# Patient Record
Sex: MALE | Race: WHITE | NOT HISPANIC OR LATINO | Employment: OTHER | URBAN - METROPOLITAN AREA
[De-identification: names, ages, dates, MRNs, and addresses within clinical notes are randomized per-mention and may not be internally consistent; named-entity substitution may affect disease eponyms.]

---

## 2019-04-24 ENCOUNTER — OFFICE VISIT (OUTPATIENT)
Dept: INTERNAL MEDICINE | Facility: CLINIC | Age: 29
End: 2019-04-24
Payer: MEDICARE

## 2019-04-24 ENCOUNTER — LAB VISIT (OUTPATIENT)
Dept: LAB | Facility: HOSPITAL | Age: 29
End: 2019-04-24
Attending: INTERNAL MEDICINE
Payer: MEDICARE

## 2019-04-24 VITALS
BODY MASS INDEX: 22.84 KG/M2 | HEART RATE: 62 BPM | DIASTOLIC BLOOD PRESSURE: 60 MMHG | HEIGHT: 67 IN | RESPIRATION RATE: 18 BRPM | TEMPERATURE: 98 F | WEIGHT: 145.5 LBS | SYSTOLIC BLOOD PRESSURE: 118 MMHG

## 2019-04-24 DIAGNOSIS — R61 HYPERHIDROSIS: Primary | ICD-10-CM

## 2019-04-24 DIAGNOSIS — F98.8 ATTENTION DEFICIT DISORDER, UNSPECIFIED HYPERACTIVITY PRESENCE: ICD-10-CM

## 2019-04-24 DIAGNOSIS — F41.9 ANXIETY: ICD-10-CM

## 2019-04-24 DIAGNOSIS — R61 HYPERHIDROSIS: ICD-10-CM

## 2019-04-24 DIAGNOSIS — F31.9 BIPOLAR AFFECTIVE DISORDER, REMISSION STATUS UNSPECIFIED: ICD-10-CM

## 2019-04-24 LAB — TSH SERPL DL<=0.005 MIU/L-ACNC: 0.6 UIU/ML (ref 0.4–4)

## 2019-04-24 PROCEDURE — 99999 PR PBB SHADOW E&M-NEW PATIENT-LVL III: ICD-10-PCS | Mod: PBBFAC,,, | Performed by: INTERNAL MEDICINE

## 2019-04-24 PROCEDURE — 3008F BODY MASS INDEX DOCD: CPT | Mod: CPTII,S$GLB,, | Performed by: INTERNAL MEDICINE

## 2019-04-24 PROCEDURE — 36415 COLL VENOUS BLD VENIPUNCTURE: CPT | Mod: PO

## 2019-04-24 PROCEDURE — 99204 OFFICE O/P NEW MOD 45 MIN: CPT | Mod: S$GLB,,, | Performed by: INTERNAL MEDICINE

## 2019-04-24 PROCEDURE — 99999 PR PBB SHADOW E&M-NEW PATIENT-LVL III: CPT | Mod: PBBFAC,,, | Performed by: INTERNAL MEDICINE

## 2019-04-24 PROCEDURE — 84443 ASSAY THYROID STIM HORMONE: CPT

## 2019-04-24 PROCEDURE — 99204 PR OFFICE/OUTPT VISIT, NEW, LEVL IV, 45-59 MIN: ICD-10-PCS | Mod: S$GLB,,, | Performed by: INTERNAL MEDICINE

## 2019-04-24 PROCEDURE — 3008F PR BODY MASS INDEX (BMI) DOCUMENTED: ICD-10-PCS | Mod: CPTII,S$GLB,, | Performed by: INTERNAL MEDICINE

## 2019-04-24 RX ORDER — DEXTROAMPHETAMINE SACCHARATE, AMPHETAMINE ASPARTATE, DEXTROAMPHETAMINE SULFATE AND AMPHETAMINE SULFATE 5; 5; 5; 5 MG/1; MG/1; MG/1; MG/1
20 TABLET ORAL 2 TIMES DAILY
Refills: 0 | COMMUNITY
Start: 2019-04-08

## 2019-04-24 RX ORDER — HYDROXYZINE HYDROCHLORIDE 50 MG/1
50 TABLET, FILM COATED ORAL 3 TIMES DAILY
Refills: 3 | COMMUNITY
Start: 2019-03-20 | End: 2023-01-30

## 2019-04-24 RX ORDER — CLONAZEPAM 0.5 MG/1
0.5 TABLET ORAL 3 TIMES DAILY
Refills: 3 | COMMUNITY
Start: 2019-03-18 | End: 2023-01-30

## 2019-04-24 RX ORDER — OLANZAPINE 10 MG/1
10 TABLET ORAL NIGHTLY
Refills: 0 | COMMUNITY
Start: 2019-01-25 | End: 2023-01-31 | Stop reason: DRUGHIGH

## 2019-04-24 RX ORDER — FLUOXETINE HYDROCHLORIDE 20 MG/1
20 CAPSULE ORAL EVERY MORNING
Refills: 0 | COMMUNITY
Start: 2019-01-23 | End: 2019-06-27

## 2019-04-24 RX ORDER — LAMOTRIGINE 100 MG/1
TABLET ORAL
Refills: 3 | COMMUNITY
Start: 2019-03-18 | End: 2019-06-27

## 2019-04-24 NOTE — PROGRESS NOTES
Subjective:       Patient ID: Mark Rich is a 29 y.o. male.    Chief Complaint: Establish Care and Excessive Sweating (both hands)    HPI   Pt with Bipolar d/o, anxiety, ADD is here for evaluation of worsening symptoms of hyperhidrosis over the last few years loacted in his axillar/ palms and feet. He has not tried any OTC meds for relief.   Review of Systems   Constitutional: Negative for activity change, appetite change, chills, diaphoresis, fatigue, fever and unexpected weight change.   HENT: Negative for postnasal drip, rhinorrhea, sinus pressure, sneezing, sore throat, trouble swallowing and voice change.    Respiratory: Negative for cough, shortness of breath and wheezing.    Cardiovascular: Negative for chest pain, palpitations and leg swelling.   Gastrointestinal: Negative for abdominal pain, blood in stool, constipation, diarrhea, nausea and vomiting.   Genitourinary: Negative for dysuria.   Musculoskeletal: Negative for arthralgias and myalgias.   Skin: Negative for rash and wound.   Allergic/Immunologic: Negative for environmental allergies and food allergies.   Hematological: Negative for adenopathy. Does not bruise/bleed easily.   Psychiatric/Behavioral: Positive for dysphoric mood. Negative for self-injury and suicidal ideas. The patient is nervous/anxious.        Objective:      Physical Exam   Constitutional: He is oriented to person, place, and time. He appears well-developed and well-nourished. No distress.   HENT:   Head: Normocephalic and atraumatic.   Eyes: Pupils are equal, round, and reactive to light. Conjunctivae and EOM are normal. Right eye exhibits no discharge. Left eye exhibits no discharge. No scleral icterus.   Neck: Normal range of motion. Neck supple. No JVD present.   Cardiovascular: Normal rate, regular rhythm and normal heart sounds.   No murmur heard.  Pulmonary/Chest: Effort normal and breath sounds normal. No respiratory distress. He has no wheezes. He has no rales.    Abdominal: Soft. Bowel sounds are normal. There is no tenderness.   Musculoskeletal: He exhibits no edema.   Lymphadenopathy:     He has no cervical adenopathy.   Neurological: He is alert and oriented to person, place, and time.   Skin: Skin is warm and dry. No rash noted. He is not diaphoretic. No pallor.       Assessment:       1. Hyperhidrosis    2. Bipolar affective disorder, remission status unspecified    3. Anxiety    4. Attention deficit disorder, unspecified hyperactivity presence        Plan:    1. Referral to Derm       Rx Drysol qHS       Check TSH   2-4. Stable, CPT          Followed by Psyc

## 2019-05-02 ENCOUNTER — TELEPHONE (OUTPATIENT)
Dept: DERMATOLOGY | Facility: CLINIC | Age: 29
End: 2019-05-02

## 2019-05-02 NOTE — TELEPHONE ENCOUNTER
----- Message from Padmini Arizmendi sent at 5/2/2019  3:50 PM CDT -----  Contact: Patient   Patient Returning Call from [facility]    Who Left Message for Patient: Nurse    Communication Preference:[phone] (M)

## 2019-06-27 ENCOUNTER — OFFICE VISIT (OUTPATIENT)
Dept: DERMATOLOGY | Facility: CLINIC | Age: 29
End: 2019-06-27
Payer: MEDICARE

## 2019-06-27 DIAGNOSIS — L74.510 HYPERHIDROSIS OF AXILLA: ICD-10-CM

## 2019-06-27 DIAGNOSIS — L74.512 HYPERHIDROSIS OF PALMS: Primary | ICD-10-CM

## 2019-06-27 PROCEDURE — 99999 PR PBB SHADOW E&M-EST. PATIENT-LVL II: CPT | Mod: PBBFAC,,, | Performed by: DERMATOLOGY

## 2019-06-27 PROCEDURE — 99999 PR PBB SHADOW E&M-EST. PATIENT-LVL II: ICD-10-PCS | Mod: PBBFAC,,, | Performed by: DERMATOLOGY

## 2019-06-27 PROCEDURE — 99202 OFFICE O/P NEW SF 15 MIN: CPT | Mod: S$GLB,,, | Performed by: DERMATOLOGY

## 2019-06-27 PROCEDURE — 99202 PR OFFICE/OUTPT VISIT, NEW, LEVL II, 15-29 MIN: ICD-10-PCS | Mod: S$GLB,,, | Performed by: DERMATOLOGY

## 2019-06-27 RX ORDER — LAMOTRIGINE 200 MG/1
200 TABLET ORAL NIGHTLY
Refills: 0 | COMMUNITY
Start: 2019-04-30

## 2019-06-27 RX ORDER — FLUOXETINE HYDROCHLORIDE 40 MG/1
80 CAPSULE ORAL EVERY MORNING
Refills: 0 | COMMUNITY
Start: 2019-04-30

## 2019-06-27 NOTE — PROGRESS NOTES
Subjective:       Patient ID:  Mark Rich is a 29 y.o. male who presents for   Chief Complaint   Patient presents with    Excessive Sweating     hands     Pt presnets for excessive sweating to hands  x whole life.  tx w rx cream.  Doesn't remember name.  Didn't help.   Pt hands drip all over.  Affects his job, social life.    He sweats a lot under arms and on feet as well but hands are the most debilitating.        Review of Systems   Constitutional: Negative for fever and chills.   Skin: Negative for rash.   Psychiatric/Behavioral: Positive for anxiety. Negative for depressed mood.        Objective:    Physical Exam   Constitutional: He appears well-developed and well-nourished. No distress.   Neurological: He is alert and oriented to person, place, and time. He is not disoriented.   Psychiatric: He has a normal mood and affect.   Skin:   Areas Examined (abnormalities noted in diagram):   Chest / Axilla Inspection Performed  RUE Inspected  LUE Inspection Performed                  Diagram Legend     Erythematous scaling macule/papule c/w actinic keratosis       Vascular papule c/w angioma      Pigmented verrucoid papule/plaque c/w seborrheic keratosis      Yellow umbilicated papule c/w sebaceous hyperplasia      Irregularly shaped tan macule c/w lentigo     1-2 mm smooth white papules consistent with Milia      Movable subcutaneous cyst with punctum c/w epidermal inclusion cyst      Subcutaneous movable cyst c/w pilar cyst      Firm pink to brown papule c/w dermatofibroma      Pedunculated fleshy papule(s) c/w skin tag(s)      Evenly pigmented macule c/w junctional nevus     Mildly variegated pigmented, slightly irregular-bordered macule c/w mildly atypical nevus      Flesh colored to evenly pigmented papule c/w intradermal nevus       Pink pearly papule/plaque c/w basal cell carcinoma      Erythematous hyperkeratotic cursted plaque c/w SCC      Surgical scar with no sign of skin cancer recurrence       Open and closed comedones      Inflammatory papules and pustules      Verrucoid papule consistent consistent with wart     Erythematous eczematous patches and plaques     Dystrophic onycholytic nail with subungual debris c/w onychomycosis     Umbilicated papule    Erythematous-base heme-crusted tan verrucoid plaque consistent with inflamed seborrheic keratosis     Erythematous Silvery Scaling Plaque c/w Psoriasis     See annotation      Assessment / Plan:        Hyperhidrosis of palms  Iontophoresis- research on sweathelp.org  Qbrexa, cut cloth in half and apply to hands for 1 hour then rinse off, use 1-2 times per week and titate up as tolerated  Repeatedly discussed that must avoid contact with eyes, risk of dry mouth  Would like to rx robinul but [facility] states major interaction with zyprexa. Will discuss with his psychiatrist  Consider botox to palms           Follow up in about 2 months (around 8/27/2019).

## 2019-07-07 ENCOUNTER — PATIENT MESSAGE (OUTPATIENT)
Dept: DERMATOLOGY | Facility: CLINIC | Age: 29
End: 2019-07-07

## 2019-07-17 ENCOUNTER — TELEPHONE (OUTPATIENT)
Dept: DERMATOLOGY | Facility: CLINIC | Age: 29
End: 2019-07-17

## 2019-07-17 NOTE — TELEPHONE ENCOUNTER
----- Message from Raquel Platt MD sent at 7/17/2019  3:22 PM CDT -----  Can you call him and get his psychiatrists number? When I called that day the office was closed. He wants to start robinol but it states that it interferes with his zyprexa.  I need to call her. thanks

## 2020-10-05 ENCOUNTER — PATIENT MESSAGE (OUTPATIENT)
Dept: ADMINISTRATIVE | Facility: HOSPITAL | Age: 30
End: 2020-10-05

## 2021-01-04 ENCOUNTER — PATIENT MESSAGE (OUTPATIENT)
Dept: ADMINISTRATIVE | Facility: HOSPITAL | Age: 31
End: 2021-01-04

## 2021-04-05 ENCOUNTER — PATIENT MESSAGE (OUTPATIENT)
Dept: ADMINISTRATIVE | Facility: HOSPITAL | Age: 31
End: 2021-04-05

## 2021-10-04 ENCOUNTER — PATIENT MESSAGE (OUTPATIENT)
Dept: ADMINISTRATIVE | Facility: HOSPITAL | Age: 31
End: 2021-10-04

## 2022-01-18 ENCOUNTER — PATIENT MESSAGE (OUTPATIENT)
Dept: ADMINISTRATIVE | Facility: HOSPITAL | Age: 32
End: 2022-01-18
Payer: MEDICARE

## 2022-03-05 ENCOUNTER — HOSPITAL ENCOUNTER (EMERGENCY)
Facility: HOSPITAL | Age: 32
Discharge: HOME | End: 2022-03-05
Attending: EMERGENCY MEDICINE
Payer: MEDICARE

## 2022-03-05 VITALS
HEART RATE: 93 BPM | RESPIRATION RATE: 16 BRPM | HEIGHT: 66 IN | TEMPERATURE: 99 F | OXYGEN SATURATION: 100 % | BODY MASS INDEX: 23.3 KG/M2 | WEIGHT: 145 LBS | SYSTOLIC BLOOD PRESSURE: 121 MMHG | DIASTOLIC BLOOD PRESSURE: 68 MMHG
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
## 2022-03-05 DIAGNOSIS — M25.512 ACUTE PAIN OF LEFT SHOULDER: Primary | ICD-10-CM

## 2022-03-05 PROCEDURE — 25000003 PHARM REV CODE 250: Performed by: NURSE PRACTITIONER

## 2022-03-05 PROCEDURE — 99284 EMERGENCY DEPT VISIT MOD MDM: CPT | Mod: 25

## 2022-03-05 RX ORDER — IBUPROFEN 800 MG/1
800 TABLET ORAL EVERY 6 HOURS PRN
Qty: 20 TABLET | Refills: 0 | Status: SHIPPED | OUTPATIENT
Start: 2022-03-05 | End: 2023-01-30

## 2022-03-05 RX ORDER — METHOCARBAMOL 500 MG/1
500 TABLET, FILM COATED ORAL
Status: COMPLETED | OUTPATIENT
Start: 2022-03-05 | End: 2022-03-05

## 2022-03-05 RX ORDER — IBUPROFEN 400 MG/1
800 TABLET ORAL
Status: COMPLETED | OUTPATIENT
Start: 2022-03-05 | End: 2022-03-05

## 2022-03-05 RX ORDER — METHOCARBAMOL 500 MG/1
500 TABLET, FILM COATED ORAL 3 TIMES DAILY
Qty: 15 TABLET | Refills: 0 | Status: SHIPPED | OUTPATIENT
Start: 2022-03-05 | End: 2022-03-10

## 2022-03-05 RX ADMIN — IBUPROFEN 800 MG: 400 TABLET, FILM COATED ORAL at 04:03

## 2022-03-05 RX ADMIN — METHOCARBAMOL 500 MG: 500 TABLET ORAL at 04:03

## 2022-03-05 NOTE — ED PROVIDER NOTES
Encounter Date: 3/5/2022    SCRIBE #1 NOTE: I, Jc Kim, am scribing for, and in the presence of, Adriana Magallanes NP.       History     Chief Complaint   Patient presents with    Shoulder Pain     Left shoulder pain x 2-3 weeks, increased pain with movement, no trauma/falls/ swelling/ +2 radial pulse      Patient is a 32 y.o. male who has a past medical history of ADHD (attention deficit hyperactivity disorder), Anxiety, and Bipolar affective disorder, presents to the ER with an evaluation of shoulder pain. Patient reports symptoms started about 2-3 weeks ago, located to his left shoulder, and have been increasing with pain. Patient reports lifting something heavy and states it hurts to move his shoulder around. He denies any injury or fall. Patient denies any fever, rash, neck stiffness, chest pain, or shortness of breath.    The history is provided by the patient and medical records.     Review of patient's allergies indicates:   Allergen Reactions    Sulfa (sulfonamide antibiotics) Other (See Comments)      Pt not sure of reaction     Past Medical History:   Diagnosis Date    ADHD (attention deficit hyperactivity disorder)     Anxiety     Bipolar affective disorder      Past Surgical History:   Procedure Laterality Date    ADENOIDECTOMY      TONSILLECTOMY       Family History   Problem Relation Age of Onset    Colon cancer Mother     Pancreatic cancer Father      Social History     Tobacco Use    Smoking status: Current Some Day Smoker    Smokeless tobacco: Never Used   Substance Use Topics    Alcohol use: Yes     Comment: social     Drug use: Never     Review of Systems   Constitutional: Negative for fever.   HENT: Negative for sore throat.    Respiratory: Negative for shortness of breath.    Cardiovascular: Negative for chest pain.   Gastrointestinal: Negative for nausea.   Genitourinary: Negative for dysuria.   Musculoskeletal: Positive for arthralgias and myalgias. Negative for back pain and  neck stiffness.        Positive left shoulder pain   Skin: Negative for rash.   Neurological: Negative for weakness.   Hematological: Does not bruise/bleed easily.   All other systems reviewed and are negative.      Physical Exam     Initial Vitals [03/05/22 1407]   BP Pulse Resp Temp SpO2   121/68 93 16 99 °F (37.2 °C) 100 %      MAP       --         Physical Exam    Nursing note and vitals reviewed.  Constitutional: He appears well-developed and well-nourished. He is not diaphoretic. No distress.   HENT:   Head: Normocephalic and atraumatic.   Mouth/Throat: Oropharynx is clear and moist.   Eyes: Conjunctivae are normal.   Neck: Neck supple.   Normal range of motion.  Cardiovascular: Normal rate and regular rhythm.   Pulmonary/Chest: Breath sounds normal. No respiratory distress.   Musculoskeletal:      Cervical back: Normal range of motion and neck supple. No edema, erythema or rigidity. Normal range of motion.     Neurological: He is alert and oriented to person, place, and time.   Skin: Skin is warm and dry. Capillary refill takes less than 2 seconds. No rash noted. No pallor.   Psychiatric: He has a normal mood and affect.         ED Course   Procedures  Labs Reviewed - No data to display       Imaging Results          X-Ray Shoulder Trauma Left (Final result)  Result time 03/05/22 15:27:33    Final result by Alf Miller MD (03/05/22 15:27:33)                 Impression:      1. No acute displaced fracture or dislocation of the left shoulder.      Electronically signed by: Alf Miller MD  Date:    03/05/2022  Time:    15:27             Narrative:    EXAMINATION:  XR SHOULDER TRAUMA 3 VIEW LEFT    CLINICAL HISTORY:  Unspecified fall, initial encounter    TECHNIQUE:  Three views of the left shoulder were performed.    COMPARISON  None    FINDINGS:  Four views left shoulder.    The left humeral head maintains appropriate relationship with the glenoid.  The acromioclavicular joint is intact.  No acute  displaced left rib fracture.  The left lung zones are clear.  No radiopaque foreign body.                                 Medications   ibuprofen tablet 800 mg (800 mg Oral Given 3/5/22 1608)   methocarbamoL tablet 500 mg (500 mg Oral Given 3/5/22 1608)     Medical Decision Making:   Initial Assessment:   Patient is a 32 y.o. male who has a past medical history of ADHD (attention deficit hyperactivity disorder), Anxiety, and Bipolar affective disorder, presents to the ER with an evaluation of shoulder pain.  Differential Diagnosis:   Rotator cuff injury, tendonitis, bursitis, arthritis, cellulitis, septic joint, cardiac or intraabdominal cause, cervical radiculopathy, dislocation, fracture     Clinical Tests:   Radiological Study: Ordered and Reviewed          Scribe Attestation:   Scribe #1: I performed the above scribed service and the documentation accurately describes the services I performed. I attest to the accuracy of the note.        ED Course as of 03/05/22 1630   Sat Mar 05, 2022   1629 Pt was notified of the need for follow up care with ortho and a referral was sent. Pt is not toxic in appearance, moves left shoulder well and is stable for dc.  [DT]      ED Course User Index  [DT] Adriana Magallanes NP           I, Adriana Magallanes NP, personally performed the services described in this documentation.All medical record entries made by the scribe were at my direction and in my presence.I have reviewed the chart and agree that the record reflects my personal performance and is accurate and complete.   Clinical Impression:   Final diagnoses:  [W19.XXXA] Fall  [M25.512] Acute pain of left shoulder (Primary)          ED Disposition Condition    Discharge Stable        ED Prescriptions     Medication Sig Dispense Start Date End Date Auth. Provider    methocarbamoL (ROBAXIN) 500 MG Tab Take 1 tablet (500 mg total) by mouth 3 (three) times daily. for 5 days 15 tablet 3/5/2022 3/10/2022 Adriana Magallanes NP     ibuprofen (ADVIL,MOTRIN) 800 MG tablet Take 1 tablet (800 mg total) by mouth every 6 (six) hours as needed for Pain. 20 tablet 3/5/2022  Adriana Magallanes NP        Follow-up Information     Follow up With Specialties Details Why Contact Info    Ryan Butcher,  Internal Medicine Schedule an appointment as soon as possible for a visit in 2 days  [address] [facility] [address]  [phone]             Adriana Magallanes NP  03/05/22 9819

## 2022-03-05 NOTE — Clinical Note
"Mark Frias" Layla was seen and treated in our emergency department on 3/5/2022.  He may return to work on 03/07/2022.       If you have any questions or concerns, please don't hesitate to call.      Adriana Magallanes NP"

## 2022-03-05 NOTE — DISCHARGE INSTRUCTIONS

## 2022-03-08 ENCOUNTER — TELEPHONE (OUTPATIENT)
Dept: ADMINISTRATIVE | Facility: OTHER | Age: 32
End: 2022-03-08
Payer: MEDICARE

## 2022-03-16 ENCOUNTER — PATIENT MESSAGE (OUTPATIENT)
Dept: ADMINISTRATIVE | Facility: HOSPITAL | Age: 32
End: 2022-03-16
Payer: MEDICARE

## 2023-01-30 ENCOUNTER — LAB VISIT (OUTPATIENT)
Dept: LAB | Facility: HOSPITAL | Age: 33
End: 2023-01-30
Payer: MEDICARE

## 2023-01-30 ENCOUNTER — OFFICE VISIT (OUTPATIENT)
Dept: INTERNAL MEDICINE | Facility: CLINIC | Age: 33
End: 2023-01-30
Payer: MEDICARE

## 2023-01-30 VITALS
HEIGHT: 66 IN | BODY MASS INDEX: 22.92 KG/M2 | DIASTOLIC BLOOD PRESSURE: 62 MMHG | HEART RATE: 70 BPM | SYSTOLIC BLOOD PRESSURE: 118 MMHG | WEIGHT: 142.63 LBS

## 2023-01-30 DIAGNOSIS — Z76.89 ENCOUNTER TO ESTABLISH CARE: Primary | ICD-10-CM

## 2023-01-30 DIAGNOSIS — D64.9 ANEMIA, UNSPECIFIED TYPE: ICD-10-CM

## 2023-01-30 DIAGNOSIS — K64.9 HEMORRHOIDS, UNSPECIFIED HEMORRHOID TYPE: ICD-10-CM

## 2023-01-30 DIAGNOSIS — D53.9 NUTRITIONAL ANEMIA, UNSPECIFIED: ICD-10-CM

## 2023-01-30 DIAGNOSIS — Z76.89 ENCOUNTER TO ESTABLISH CARE: ICD-10-CM

## 2023-01-30 DIAGNOSIS — K90.89 OTHER INTESTINAL MALABSORPTION: ICD-10-CM

## 2023-01-30 DIAGNOSIS — R53.83 OTHER FATIGUE: ICD-10-CM

## 2023-01-30 PROCEDURE — 99999 PR PBB SHADOW E&M-EST. PATIENT-LVL III: ICD-10-PCS | Mod: PBBFAC,GC,,

## 2023-01-30 PROCEDURE — 87389 HIV-1 AG W/HIV-1&-2 AB AG IA: CPT

## 2023-01-30 PROCEDURE — 84466 ASSAY OF TRANSFERRIN: CPT

## 2023-01-30 PROCEDURE — 80061 LIPID PANEL: CPT

## 2023-01-30 PROCEDURE — 99203 PR OFFICE/OUTPT VISIT, NEW, LEVL III, 30-44 MIN: ICD-10-PCS | Mod: GC,S$GLB,,

## 2023-01-30 PROCEDURE — 3008F PR BODY MASS INDEX (BMI) DOCUMENTED: ICD-10-PCS | Mod: CPTII,GC,S$GLB,

## 2023-01-30 PROCEDURE — 82607 VITAMIN B-12: CPT

## 2023-01-30 PROCEDURE — 82728 ASSAY OF FERRITIN: CPT

## 2023-01-30 PROCEDURE — 82746 ASSAY OF FOLIC ACID SERUM: CPT

## 2023-01-30 PROCEDURE — 3078F PR MOST RECENT DIASTOLIC BLOOD PRESSURE < 80 MM HG: ICD-10-PCS | Mod: CPTII,GC,S$GLB,

## 2023-01-30 PROCEDURE — 3078F DIAST BP <80 MM HG: CPT | Mod: CPTII,GC,S$GLB,

## 2023-01-30 PROCEDURE — 99999 PR PBB SHADOW E&M-EST. PATIENT-LVL III: CPT | Mod: PBBFAC,GC,,

## 2023-01-30 PROCEDURE — 86803 HEPATITIS C AB TEST: CPT

## 2023-01-30 PROCEDURE — 80053 COMPREHEN METABOLIC PANEL: CPT

## 2023-01-30 PROCEDURE — 85025 COMPLETE CBC W/AUTO DIFF WBC: CPT

## 2023-01-30 PROCEDURE — 3008F BODY MASS INDEX DOCD: CPT | Mod: CPTII,GC,S$GLB,

## 2023-01-30 PROCEDURE — 99203 OFFICE O/P NEW LOW 30 MIN: CPT | Mod: GC,S$GLB,,

## 2023-01-30 PROCEDURE — 36415 COLL VENOUS BLD VENIPUNCTURE: CPT

## 2023-01-30 PROCEDURE — 3074F SYST BP LT 130 MM HG: CPT | Mod: CPTII,GC,S$GLB,

## 2023-01-30 PROCEDURE — 3074F PR MOST RECENT SYSTOLIC BLOOD PRESSURE < 130 MM HG: ICD-10-PCS | Mod: CPTII,GC,S$GLB,

## 2023-01-30 RX ORDER — AMOXICILLIN 250 MG
2 CAPSULE ORAL DAILY
Qty: 180 TABLET | Refills: 3 | Status: SHIPPED | OUTPATIENT
Start: 2023-01-30

## 2023-01-30 NOTE — PATIENT INSTRUCTIONS
LABS TODAY  - CBC BLOOD COUNT  - CMP METABOLIC PANEL  - LIPID / CHOLESTEROL PANEL  - IRON, FERRITIN  - B12  - FOLATE  - HEP C, HIV    - SENNA DOC DAILY  - OVER THE COUNTER H CREAM FOR HEMORRHOIDS  - GASTROENTEROLOGY WILL CALL YOU FOR AN APPT - WOULD LIKE YOU TO SEE THEM BY END OF FEBRUARY

## 2023-01-30 NOTE — PROGRESS NOTES
Mark Rich  1990    Subjective:     Chief Complaint:    History of Present Illness:  Mr. Mark Rich is a 32 y.o. male who presents to clinic to establish care and also has multiple complaints. Last seen in 2019.    Medical problems:  Anxiety/depression  ADHD  Hemorrhoids? (Unconfirmed)  Had partial colectomy as a child??? Related to reflux???    Weakness  - Ongoing for weeks but worse in the last 1 week  - More related to fatigue/malaise than actual muscle weakness  - Gets dizzy sometimes as well  - Does not think he has orthostatics  - Thinks it may be related to anemia from hemorrhoids?  - Checked self for COVID, -ve  - Thinks he has a good diet, high protein and fiber intake but reportedly but unclear on fresh produce intake    Hemorrhoids (self diagnosed not confirmed?)  - Every time pt has a BM, he sees blood mixed in with stool  - BMs daily  - Gets pain with BM sometimes   - Tries to eat more fiber but still has to strain at times, gets stressed and then gets constipation, then problem comes back?  - Hesitant to see a specialist at this time    Nausea/vomiting/GERD  - Worsening lately however pt has had this issue on/off for years  - Greasy foods make it worse  - Bread? Mexican food?  - Sometimes gets diarrhea/softer than normal stools right after eating but this doesn't happen often, not sure if this is associated with particular foods  - Unclear if related to surgery as a child    Social history:  - Smoking: trying to quit, smokes 1 cigarette every few week, may be related to procedure as a child?  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      Review of Systems   Constitutional:  Negative for fever and malaise/fatigue.   HENT: Negative.     Eyes: Negative.    Respiratory: Negative.  Negative for cough.    Cardiovascular: Negative.   "  Gastrointestinal:  Positive for blood in stool and nausea. Negative for vomiting.   Genitourinary: Negative.    Musculoskeletal: Negative.    Skin: Negative.    Neurological:  Positive for weakness.   Psychiatric/Behavioral: Negative.        PMH:     Past Medical History:   Diagnosis Date    ADHD (attention deficit hyperactivity disorder)     Anxiety     Bipolar affective disorder     Unspecified hemorrhoids      Past Surgical History:   Procedure Laterality Date    ADENOIDECTOMY      TONSILLECTOMY       Allergies:     Review of patient's allergies indicates:   Allergen Reactions    Sulfa (sulfonamide antibiotics) Other (See Comments)      Pt not sure of reaction     Medications:     Current Outpatient Medications on File Prior to Visit   Medication Sig Dispense Refill    dextroamphetamine-amphetamine (ADDERALL) 20 mg tablet Take 1 tablet by mouth 2 (two) times daily.  0    FLUoxetine 40 MG capsule Take 80 mg by mouth every morning.  0    lamoTRIgine (LAMICTAL) 200 MG tablet Take 200 mg by mouth every evening.  0    OLANZapine (ZYPREXA) 10 MG tablet Take 10 mg by mouth every evening.  0    [DISCONTINUED] clonazePAM (KLONOPIN) 0.5 MG tablet Take 0.5 mg by mouth 3 (three) times daily.  3    [DISCONTINUED] hydrOXYzine (ATARAX) 50 MG tablet Take 50 mg by mouth 3 (three) times daily.  3    [DISCONTINUED] ibuprofen (ADVIL,MOTRIN) 800 MG tablet Take 1 tablet (800 mg total) by mouth every 6 (six) hours as needed for Pain. 20 tablet 0     No current facility-administered medications on file prior to visit.     Social History:     Social History     Tobacco Use    Smoking status: Some Days    Smokeless tobacco: Never   Substance Use Topics    Alcohol use: Yes     Comment: social      Family History:     Family History   Problem Relation Age of Onset    Colon cancer Mother     Pancreatic cancer Father      Physical Exam:   /62   Pulse 70   Ht 5' 6" (1.676 m)   Wt 64.7 kg (142 lb 10.2 oz)   BMI 23.02 kg/m²  "   SITTING BP ABOVE  STANDING /60 WITH HR 75    Body mass index is 23.02 kg/m².     Physical Exam  HENT:      Mouth/Throat:      Mouth: Mucous membranes are moist.   Eyes:      Pupils: Pupils are equal, round, and reactive to light.      Comments: Significant conjunctival pallor   Cardiovascular:      Rate and Rhythm: Normal rate and regular rhythm.      Pulses: Normal pulses.      Heart sounds: Normal heart sounds.   Pulmonary:      Effort: Pulmonary effort is normal.      Breath sounds: Normal breath sounds.   Abdominal:      General: Abdomen is flat. There is no distension.      Palpations: Abdomen is soft. There is no mass.      Tenderness: There is abdominal tenderness (minimal RLQ LLQ tenderness). There is no guarding.      Hernia: No hernia is present.      Comments: Scar across lower abdomen   Musculoskeletal:      Cervical back: Normal range of motion.      Right lower leg: No edema.      Left lower leg: No edema.   Skin:     General: Skin is warm and dry.      Capillary Refill: Capillary refill takes less than 2 seconds.      Coloration: Skin is pale.   Neurological:      Mental Status: He is alert and oriented to person, place, and time. Mental status is at baseline.   Psychiatric:         Mood and Affect: Mood normal.        Laboratory  Lab Results   Component Value Date    WBC 3.96 (L) 02/07/2007    HGB 15.0 02/07/2007    HCT 45.4 02/07/2007    MCV 90.4 02/07/2007     02/07/2007     No results found for: GLU, NA, K, CL, CO2, BUN, CREATININE, CALCIUM, MG  No results found for: INR, PROTIME  No results found for: HGBA1C  No results for input(s): POCTGLUCOSE in the last 72 hours.      Health Maintenance         Date Due Completion Date    Hepatitis C Screening Never done ---    Lipid Panel Never done ---    COVID-19 Vaccine (1) Never done ---    HIV Screening Never done ---    TETANUS VACCINE Never done ---    Influenza Vaccine (1) Never done ---            Assessment & Plan:     1. Encounter  to establish care  HEPATITIS C ANTIBODY    LIPID PANEL    HIV 1/2 Ag/Ab (4th Gen)    COMPREHENSIVE METABOLIC PANEL      2. Other fatigue        3. Anemia, unspecified type  CBC Auto Differential    IRON AND TIBC    FERRITIN    VITAMIN B12    FOLATE    Ambulatory referral/consult to Gastroenterology      4. Hemorrhoids, unspecified hemorrhoid type  Ambulatory referral/consult to Gastroenterology      5. Other intestinal malabsorption  LIPID PANEL      6. Nutritional anemia, unspecified  VITAMIN B12    FOLATE        Mark Rich was seen today to establish care and for weakness, ?hemorrhoids    - Check annual labs: CBC CMP, hep C HIV, lipid panel    - Given exam highly suggestive of anemia, will check iron labs, B12 and folate. Suspecting iron deficiency anemia related to blood loss from possible hemorrhoids    - Will refer to GI for proper diagnosis of ?internal hemorrhoids and potentially treatment if pt is truly anemia on labwork. Even if labs return normal suggest he see a specialist. In the meantime suggest senna-doc daily, high fiber daily >25 grams, and over the counter hemorrhoids cream when straining hurts.      Return to clinic in 6 months for follow up    Discussed with Staff: Dr. Meg Story MD  Internal Medicine PGY-2  [facility]  [address] [place] [address] [phone]

## 2023-01-31 ENCOUNTER — HOSPITAL ENCOUNTER (EMERGENCY)
Facility: HOSPITAL | Age: 33
Discharge: LEFT BEFORE DISCHARGE | End: 2023-02-01
Attending: EMERGENCY MEDICINE | Admitting: INTERNAL MEDICINE
Payer: MEDICARE

## 2023-01-31 ENCOUNTER — TELEPHONE (OUTPATIENT)
Dept: INTERNAL MEDICINE | Facility: CLINIC | Age: 33
End: 2023-01-31
Payer: MEDICARE

## 2023-01-31 VITALS
BODY MASS INDEX: 22.92 KG/M2 | SYSTOLIC BLOOD PRESSURE: 121 MMHG | HEART RATE: 78 BPM | DIASTOLIC BLOOD PRESSURE: 82 MMHG | TEMPERATURE: 99 F | WEIGHT: 142 LBS | OXYGEN SATURATION: 100 % | RESPIRATION RATE: 18 BRPM

## 2023-01-31 DIAGNOSIS — K58.9 IRRITABLE BOWEL SYNDROME, UNSPECIFIED TYPE: ICD-10-CM

## 2023-01-31 DIAGNOSIS — F31.9 BIPOLAR AFFECTIVE DISORDER, REMISSION STATUS UNSPECIFIED: ICD-10-CM

## 2023-01-31 DIAGNOSIS — D64.9 SYMPTOMATIC ANEMIA: Primary | ICD-10-CM

## 2023-01-31 DIAGNOSIS — D64.9 ANEMIA: ICD-10-CM

## 2023-01-31 DIAGNOSIS — D50.0 IRON DEFICIENCY ANEMIA DUE TO CHRONIC BLOOD LOSS: ICD-10-CM

## 2023-01-31 DIAGNOSIS — K92.1 HEMATOCHEZIA: ICD-10-CM

## 2023-01-31 PROBLEM — Z90.49 HISTORY OF PARTIAL COLECTOMY: Status: ACTIVE | Noted: 2023-01-31

## 2023-01-31 PROBLEM — Z72.0 CURRENT TOBACCO USE: Status: ACTIVE | Noted: 2023-01-31

## 2023-01-31 PROBLEM — F19.90 RECREATIONAL DRUG USE: Status: ACTIVE | Noted: 2023-01-31

## 2023-01-31 PROBLEM — F90.9 ADHD: Status: ACTIVE | Noted: 2019-04-24

## 2023-01-31 LAB
ABO + RH BLD: NORMAL
ALBUMIN SERPL BCP-MCNC: 4.3 G/DL (ref 3.5–5.2)
ALBUMIN SERPL BCP-MCNC: 4.5 G/DL (ref 3.5–5.2)
ALP SERPL-CCNC: 75 U/L (ref 55–135)
ALP SERPL-CCNC: 80 U/L (ref 55–135)
ALT SERPL W/O P-5'-P-CCNC: 16 U/L (ref 10–44)
ALT SERPL W/O P-5'-P-CCNC: 19 U/L (ref 10–44)
ANION GAP SERPL CALC-SCNC: 10 MMOL/L (ref 8–16)
ANION GAP SERPL CALC-SCNC: 9 MMOL/L (ref 8–16)
ANISOCYTOSIS BLD QL SMEAR: ABNORMAL
ANISOCYTOSIS BLD QL SMEAR: ABNORMAL
AST SERPL-CCNC: 22 U/L (ref 10–40)
AST SERPL-CCNC: 23 U/L (ref 10–40)
BASO STIPL BLD QL SMEAR: ABNORMAL
BASOPHILS # BLD AUTO: 0.05 K/UL (ref 0–0.2)
BASOPHILS # BLD AUTO: 0.06 K/UL (ref 0–0.2)
BASOPHILS NFR BLD: 0.6 % (ref 0–1.9)
BASOPHILS NFR BLD: 0.9 % (ref 0–1.9)
BILIRUB SERPL-MCNC: 0.2 MG/DL (ref 0.1–1)
BILIRUB SERPL-MCNC: 0.3 MG/DL (ref 0.1–1)
BILIRUB UR QL STRIP: NEGATIVE
BLD GP AB SCN CELLS X3 SERPL QL: NORMAL
BLD PROD TYP BPU: NORMAL
BLOOD UNIT EXPIRATION DATE: NORMAL
BLOOD UNIT TYPE CODE: 6200
BLOOD UNIT TYPE: NORMAL
BUN SERPL-MCNC: 13 MG/DL (ref 6–20)
BUN SERPL-MCNC: 8 MG/DL (ref 6–20)
BURR CELLS BLD QL SMEAR: ABNORMAL
CALCIUM SERPL-MCNC: 9.6 MG/DL (ref 8.7–10.5)
CALCIUM SERPL-MCNC: 9.8 MG/DL (ref 8.7–10.5)
CHLORIDE SERPL-SCNC: 104 MMOL/L (ref 95–110)
CHLORIDE SERPL-SCNC: 104 MMOL/L (ref 95–110)
CHOLEST SERPL-MCNC: 154 MG/DL (ref 120–199)
CHOLEST/HDLC SERPL: 3.6 {RATIO} (ref 2–5)
CLARITY UR: CLEAR
CO2 SERPL-SCNC: 25 MMOL/L (ref 23–29)
CO2 SERPL-SCNC: 25 MMOL/L (ref 23–29)
CODING SYSTEM: NORMAL
COLOR UR: YELLOW
CREAT SERPL-MCNC: 0.9 MG/DL (ref 0.5–1.4)
CREAT SERPL-MCNC: 1.2 MG/DL (ref 0.5–1.4)
CTP QC/QA: YES
DACRYOCYTES BLD QL SMEAR: ABNORMAL
DACRYOCYTES BLD QL SMEAR: ABNORMAL
DIFFERENTIAL METHOD: ABNORMAL
DIFFERENTIAL METHOD: ABNORMAL
DISPENSE STATUS: NORMAL
EOSINOPHIL # BLD AUTO: 0.2 K/UL (ref 0–0.5)
EOSINOPHIL # BLD AUTO: 0.3 K/UL (ref 0–0.5)
EOSINOPHIL NFR BLD: 2.5 % (ref 0–8)
EOSINOPHIL NFR BLD: 4.9 % (ref 0–8)
ERYTHROCYTE [DISTWIDTH] IN BLOOD BY AUTOMATED COUNT: 19.9 % (ref 11.5–14.5)
ERYTHROCYTE [DISTWIDTH] IN BLOOD BY AUTOMATED COUNT: 20.5 % (ref 11.5–14.5)
EST. GFR  (NO RACE VARIABLE): >60 ML/MIN/1.73 M^2
EST. GFR  (NO RACE VARIABLE): >60 ML/MIN/1.73 M^2
FERRITIN SERPL-MCNC: 8 NG/ML (ref 20–300)
FOLATE SERPL-MCNC: 12.5 NG/ML (ref 4–24)
GIANT PLATELETS BLD QL SMEAR: PRESENT
GLUCOSE SERPL-MCNC: 72 MG/DL (ref 70–110)
GLUCOSE SERPL-MCNC: 74 MG/DL (ref 70–110)
GLUCOSE UR QL STRIP: NEGATIVE
HCT VFR BLD AUTO: 23.8 % (ref 40–54)
HCT VFR BLD AUTO: 24.1 % (ref 40–54)
HCV AB SERPL QL IA: NORMAL
HDLC SERPL-MCNC: 43 MG/DL (ref 40–75)
HDLC SERPL: 27.9 % (ref 20–50)
HGB BLD-MCNC: 6 G/DL (ref 14–18)
HGB BLD-MCNC: 6.6 G/DL (ref 14–18)
HGB UR QL STRIP: NEGATIVE
HIV 1+2 AB+HIV1 P24 AG SERPL QL IA: NORMAL
HYPOCHROMIA BLD QL SMEAR: ABNORMAL
HYPOCHROMIA BLD QL SMEAR: ABNORMAL
IMM GRANULOCYTES # BLD AUTO: 0.02 K/UL (ref 0–0.04)
IMM GRANULOCYTES # BLD AUTO: 0.03 K/UL (ref 0–0.04)
IMM GRANULOCYTES NFR BLD AUTO: 0.3 % (ref 0–0.5)
IMM GRANULOCYTES NFR BLD AUTO: 0.4 % (ref 0–0.5)
INFLUENZA A, MOLECULAR: NEGATIVE
INFLUENZA B, MOLECULAR: NEGATIVE
IRON SERPL-MCNC: 10 UG/DL (ref 45–160)
KETONES UR QL STRIP: ABNORMAL
LDLC SERPL CALC-MCNC: 88.6 MG/DL (ref 63–159)
LEUKOCYTE ESTERASE UR QL STRIP: NEGATIVE
LYMPHOCYTES # BLD AUTO: 0.8 K/UL (ref 1–4.8)
LYMPHOCYTES # BLD AUTO: 1.2 K/UL (ref 1–4.8)
LYMPHOCYTES NFR BLD: 17.7 % (ref 18–48)
LYMPHOCYTES NFR BLD: 9.4 % (ref 18–48)
MAGNESIUM SERPL-MCNC: 1.9 MG/DL (ref 1.6–2.6)
MCH RBC QN AUTO: 18.9 PG (ref 27–31)
MCH RBC QN AUTO: 19.6 PG (ref 27–31)
MCHC RBC AUTO-ENTMCNC: 25.2 G/DL (ref 32–36)
MCHC RBC AUTO-ENTMCNC: 27.4 G/DL (ref 32–36)
MCV RBC AUTO: 72 FL (ref 82–98)
MCV RBC AUTO: 75 FL (ref 82–98)
MONOCYTES # BLD AUTO: 0.7 K/UL (ref 0.3–1)
MONOCYTES # BLD AUTO: 0.7 K/UL (ref 0.3–1)
MONOCYTES NFR BLD: 10 % (ref 4–15)
MONOCYTES NFR BLD: 9.1 % (ref 4–15)
NEUTROPHILS # BLD AUTO: 4.4 K/UL (ref 1.8–7.7)
NEUTROPHILS # BLD AUTO: 6.3 K/UL (ref 1.8–7.7)
NEUTROPHILS NFR BLD: 66.2 % (ref 38–73)
NEUTROPHILS NFR BLD: 78 % (ref 38–73)
NITRITE UR QL STRIP: NEGATIVE
NONHDLC SERPL-MCNC: 111 MG/DL
NRBC BLD-RTO: 0 /100 WBC
NRBC BLD-RTO: 0 /100 WBC
NUM UNITS TRANS PACKED RBC: NORMAL
OVALOCYTES BLD QL SMEAR: ABNORMAL
OVALOCYTES BLD QL SMEAR: ABNORMAL
PH UR STRIP: 7 [PH] (ref 5–8)
PLATELET # BLD AUTO: 335 K/UL (ref 150–450)
PLATELET # BLD AUTO: 348 K/UL (ref 150–450)
PLATELET BLD QL SMEAR: ABNORMAL
PLATELET BLD QL SMEAR: ABNORMAL
PMV BLD AUTO: 11.5 FL (ref 9.2–12.9)
PMV BLD AUTO: 12.8 FL (ref 9.2–12.9)
POIKILOCYTOSIS BLD QL SMEAR: ABNORMAL
POIKILOCYTOSIS BLD QL SMEAR: ABNORMAL
POLYCHROMASIA BLD QL SMEAR: ABNORMAL
POLYCHROMASIA BLD QL SMEAR: ABNORMAL
POTASSIUM SERPL-SCNC: 4.4 MMOL/L (ref 3.5–5.1)
POTASSIUM SERPL-SCNC: 4.4 MMOL/L (ref 3.5–5.1)
PROT SERPL-MCNC: 7.3 G/DL (ref 6–8.4)
PROT SERPL-MCNC: 7.8 G/DL (ref 6–8.4)
PROT UR QL STRIP: NEGATIVE
RBC # BLD AUTO: 3.17 M/UL (ref 4.6–6.2)
RBC # BLD AUTO: 3.36 M/UL (ref 4.6–6.2)
SARS-COV-2 RDRP RESP QL NAA+PROBE: NEGATIVE
SATURATED IRON: 2 % (ref 20–50)
SCHISTOCYTES BLD QL SMEAR: ABNORMAL
SODIUM SERPL-SCNC: 138 MMOL/L (ref 136–145)
SODIUM SERPL-SCNC: 139 MMOL/L (ref 136–145)
SP GR UR STRIP: 1.02 (ref 1–1.03)
SPECIMEN SOURCE: NORMAL
TARGETS BLD QL SMEAR: ABNORMAL
TARGETS BLD QL SMEAR: ABNORMAL
TOTAL IRON BINDING CAPACITY: 632 UG/DL (ref 250–450)
TRANSFERRIN SERPL-MCNC: 427 MG/DL (ref 200–375)
TRIGL SERPL-MCNC: 112 MG/DL (ref 30–150)
URN SPEC COLLECT METH UR: ABNORMAL
UROBILINOGEN UR STRIP-ACNC: NEGATIVE EU/DL
VIT B12 SERPL-MCNC: 380 PG/ML (ref 210–950)
WBC # BLD AUTO: 6.68 K/UL (ref 3.9–12.7)
WBC # BLD AUTO: 8.11 K/UL (ref 3.9–12.7)

## 2023-01-31 PROCEDURE — P9016 RBC LEUKOCYTES REDUCED: HCPCS | Performed by: EMERGENCY MEDICINE

## 2023-01-31 PROCEDURE — 86920 COMPATIBILITY TEST SPIN: CPT | Performed by: EMERGENCY MEDICINE

## 2023-01-31 PROCEDURE — 85025 COMPLETE CBC W/AUTO DIFF WBC: CPT | Performed by: PHYSICIAN ASSISTANT

## 2023-01-31 PROCEDURE — C9113 INJ PANTOPRAZOLE SODIUM, VIA: HCPCS | Performed by: EMERGENCY MEDICINE

## 2023-01-31 PROCEDURE — 87502 INFLUENZA DNA AMP PROBE: CPT | Performed by: STUDENT IN AN ORGANIZED HEALTH CARE EDUCATION/TRAINING PROGRAM

## 2023-01-31 PROCEDURE — 93005 ELECTROCARDIOGRAM TRACING: CPT

## 2023-01-31 PROCEDURE — 86900 BLOOD TYPING SEROLOGIC ABO: CPT | Performed by: PHYSICIAN ASSISTANT

## 2023-01-31 PROCEDURE — 99285 EMERGENCY DEPT VISIT HI MDM: CPT | Mod: 25

## 2023-01-31 PROCEDURE — 96374 THER/PROPH/DIAG INJ IV PUSH: CPT

## 2023-01-31 PROCEDURE — 36430 TRANSFUSION BLD/BLD COMPNT: CPT

## 2023-01-31 PROCEDURE — 80053 COMPREHEN METABOLIC PANEL: CPT | Performed by: PHYSICIAN ASSISTANT

## 2023-01-31 PROCEDURE — 63600175 PHARM REV CODE 636 W HCPCS: Performed by: EMERGENCY MEDICINE

## 2023-01-31 PROCEDURE — 93010 ELECTROCARDIOGRAM REPORT: CPT | Mod: ,,, | Performed by: INTERNAL MEDICINE

## 2023-01-31 PROCEDURE — 87635 SARS-COV-2 COVID-19 AMP PRB: CPT | Performed by: EMERGENCY MEDICINE

## 2023-01-31 PROCEDURE — 87502 INFLUENZA DNA AMP PROBE: CPT

## 2023-01-31 PROCEDURE — G0378 HOSPITAL OBSERVATION PER HR: HCPCS

## 2023-01-31 PROCEDURE — 93010 EKG 12-LEAD: ICD-10-PCS | Mod: ,,, | Performed by: INTERNAL MEDICINE

## 2023-01-31 PROCEDURE — 81003 URINALYSIS AUTO W/O SCOPE: CPT | Performed by: STUDENT IN AN ORGANIZED HEALTH CARE EDUCATION/TRAINING PROGRAM

## 2023-01-31 PROCEDURE — 83735 ASSAY OF MAGNESIUM: CPT | Performed by: EMERGENCY MEDICINE

## 2023-01-31 RX ORDER — IBUPROFEN 200 MG
16 TABLET ORAL
Status: DISCONTINUED | OUTPATIENT
Start: 2023-01-31 | End: 2023-02-01 | Stop reason: HOSPADM

## 2023-01-31 RX ORDER — LAMOTRIGINE 100 MG/1
200 TABLET ORAL NIGHTLY
Status: DISCONTINUED | OUTPATIENT
Start: 2023-01-31 | End: 2023-02-01 | Stop reason: HOSPADM

## 2023-01-31 RX ORDER — FLUOXETINE HYDROCHLORIDE 20 MG/1
80 CAPSULE ORAL EVERY MORNING
Status: DISCONTINUED | OUTPATIENT
Start: 2023-02-01 | End: 2023-02-01 | Stop reason: HOSPADM

## 2023-01-31 RX ORDER — IBUPROFEN 200 MG
24 TABLET ORAL
Status: DISCONTINUED | OUTPATIENT
Start: 2023-01-31 | End: 2023-02-01 | Stop reason: HOSPADM

## 2023-01-31 RX ORDER — BUSPIRONE HYDROCHLORIDE 10 MG/1
10 TABLET ORAL 2 TIMES DAILY
COMMUNITY
Start: 2022-12-11

## 2023-01-31 RX ORDER — FLUTICASONE PROPIONATE 50 MCG
2 SPRAY, SUSPENSION (ML) NASAL
COMMUNITY
Start: 2022-12-08 | End: 2023-01-31

## 2023-01-31 RX ORDER — GLUCAGON 1 MG
1 KIT INJECTION
Status: DISCONTINUED | OUTPATIENT
Start: 2023-01-31 | End: 2023-02-01 | Stop reason: HOSPADM

## 2023-01-31 RX ORDER — OLANZAPINE 5 MG/1
5 TABLET ORAL NIGHTLY
COMMUNITY
Start: 2023-01-16

## 2023-01-31 RX ORDER — PANTOPRAZOLE SODIUM 40 MG/10ML
80 INJECTION, POWDER, LYOPHILIZED, FOR SOLUTION INTRAVENOUS ONCE
Status: COMPLETED | OUTPATIENT
Start: 2023-01-31 | End: 2023-01-31

## 2023-01-31 RX ORDER — CEFDINIR 300 MG/1
300 CAPSULE ORAL EVERY 12 HOURS
COMMUNITY
Start: 2022-12-08 | End: 2023-01-31 | Stop reason: ALTCHOICE

## 2023-01-31 RX ORDER — NALOXONE HCL 0.4 MG/ML
0.02 VIAL (ML) INJECTION
Status: DISCONTINUED | OUTPATIENT
Start: 2023-01-31 | End: 2023-02-01 | Stop reason: HOSPADM

## 2023-01-31 RX ORDER — ALPRAZOLAM 0.25 MG/1
0.25 TABLET ORAL 2 TIMES DAILY
COMMUNITY
Start: 2022-12-02

## 2023-01-31 RX ORDER — VENLAFAXINE HYDROCHLORIDE 37.5 MG/1
75 CAPSULE, EXTENDED RELEASE ORAL EVERY MORNING
Status: DISCONTINUED | OUTPATIENT
Start: 2023-02-01 | End: 2023-02-01 | Stop reason: HOSPADM

## 2023-01-31 RX ORDER — VENLAFAXINE HYDROCHLORIDE 75 MG/1
75 CAPSULE, EXTENDED RELEASE ORAL EVERY MORNING
COMMUNITY
Start: 2023-01-16

## 2023-01-31 RX ORDER — LURASIDONE HYDROCHLORIDE 40 MG/1
40 TABLET, FILM COATED ORAL DAILY
COMMUNITY
Start: 2023-01-12

## 2023-01-31 RX ORDER — HYDROCODONE BITARTRATE AND ACETAMINOPHEN 500; 5 MG/1; MG/1
TABLET ORAL
Status: DISCONTINUED | OUTPATIENT
Start: 2023-01-31 | End: 2023-02-01 | Stop reason: HOSPADM

## 2023-01-31 RX ORDER — POLYETHYLENE GLYCOL 3350, SODIUM SULFATE ANHYDROUS, SODIUM BICARBONATE, SODIUM CHLORIDE, POTASSIUM CHLORIDE 236; 22.74; 6.74; 5.86; 2.97 G/4L; G/4L; G/4L; G/4L; G/4L
4000 POWDER, FOR SOLUTION ORAL ONCE
Status: DISCONTINUED | OUTPATIENT
Start: 2023-01-31 | End: 2023-02-01 | Stop reason: HOSPADM

## 2023-01-31 RX ORDER — BUSPIRONE HYDROCHLORIDE 5 MG/1
10 TABLET ORAL 2 TIMES DAILY
Status: DISCONTINUED | OUTPATIENT
Start: 2023-01-31 | End: 2023-02-01 | Stop reason: HOSPADM

## 2023-01-31 RX ORDER — OLANZAPINE 2.5 MG/1
5 TABLET ORAL NIGHTLY
Status: DISCONTINUED | OUTPATIENT
Start: 2023-01-31 | End: 2023-02-01 | Stop reason: HOSPADM

## 2023-01-31 RX ORDER — SODIUM CHLORIDE 0.9 % (FLUSH) 0.9 %
10 SYRINGE (ML) INJECTION EVERY 12 HOURS PRN
Status: DISCONTINUED | OUTPATIENT
Start: 2023-01-31 | End: 2023-02-01 | Stop reason: HOSPADM

## 2023-01-31 RX ORDER — PANTOPRAZOLE SODIUM 40 MG/10ML
80 INJECTION, POWDER, LYOPHILIZED, FOR SOLUTION INTRAVENOUS ONCE
Status: DISCONTINUED | OUTPATIENT
Start: 2023-01-31 | End: 2023-01-31

## 2023-01-31 RX ADMIN — PANTOPRAZOLE SODIUM 80 MG: 40 INJECTION, POWDER, LYOPHILIZED, FOR SOLUTION INTRAVENOUS at 03:01

## 2023-01-31 NOTE — TELEPHONE ENCOUNTER
Attempted to call patient multiple times this morning regarding lab results from yesterday. This afternoon I was able to get in touch with the patient's mother, Anu, who stated the patient had been sleeping during the day. Anu came to the patient's clinic appointment with him yesterday and the patient is ok with sharing his health information with his mother.     Discussed critical hemoglobin level of 6 and severe iron deficiency. I recommended that the patient present to a nearby ER ASAP for blood transfusions, further workup of his severe anemia/hemorrhoids/acute on chronic GIB, and how an an inpatient consult to GI would be appropriate rather than waiting to see GI in the outpatient setting.

## 2023-01-31 NOTE — ED TRIAGE NOTES
Pt presents to the ED with a complaint of low H&H and fatigue. Pt states that he has blood in his stool for about 6 months and a Hx of IBS.      Patient identifiers verified and correct.     APPEARANCE: Patient not in acute distress.  NEURO: Awake, alert, appropriate for age, condition, and situation, pupils equal, round, and reactive.   HEENT: Head symmetrical. Eyes bilateral.  Bilateral ears without drainage. Bilateral nares patent, throat clear.  CARDIAC: Regular rate and rhythm  RESPIRATORY: Airway is open and patent. Respirations are normal and spontaneous on room air.   GI/: Abdomen soft and non-distended.   NEUROVASCULAR: All extremities are warm and pallor in color. .   MUSCULOSKELETAL: Moves all extremities.   SKIN: Warm and dry, adequate turgor, mucus membranes dry and pale; no breakdown, lesions, or ecchymosis noted.     Will continue to monitor.

## 2023-01-31 NOTE — FIRST PROVIDER EVALUATION
Emergency Department TeleTriage Encounter Note      CHIEF COMPLAINT    Chief Complaint   Patient presents with    GI Bleeding     Pt has hx of IBS, and pt reports bleeding in stool x 6 mths, pt appears pale and MM dry, pt had blood drawn yesterday and H/H-6/23.8, + generalized weakness reported        VITAL SIGNS   Initial Vitals [01/31/23 1418]   BP Pulse Resp Temp SpO2   121/60 96 20 97.8 °F (36.6 °C) 100 %      MAP       --            ALLERGIES    Review of patient's allergies indicates:   Allergen Reactions    Sulfa (sulfonamide antibiotics) Other (See Comments)      Pt not sure of reaction       PROVIDER TRIAGE NOTE  This is a teletriage evaluation of a 32 y.o. male presenting to the ED complaining of GI bleeding. Patient reports black stool for 6 months. Recent labs showed anemia, was told to come to the ED. He also reports generalized weakness and vomiting as well.     Patient is alert and oriented. He speaks in complete sentences. He is sitting upright in the chair in no distress. Patient does appear pale.    Initial orders will be placed and care will be transferred to an alternate provider when patient is roomed for a full evaluation. Any additional orders and the final disposition will be determined by that provider.         ORDERS  Labs Reviewed   CBC W/ AUTO DIFFERENTIAL   COMPREHENSIVE METABOLIC PANEL   URINALYSIS, REFLEX TO URINE CULTURE   TYPE & SCREEN       ED Orders (720h ago, onward)      Start Ordered     Status Ordering Provider    01/31/23 1443 01/31/23 1442  CBC auto differential  STAT         Ordered PER YIP    01/31/23 1443 01/31/23 1442  Comprehensive metabolic panel  STAT         Ordered PER YIP    01/31/23 1443 01/31/23 1442  Insert Saline lock IV  Once         Ordered PER YIP    01/31/23 1443 01/31/23 1442  Urinalysis, Reflex to Urine Culture Urine, Clean Catch  STAT         Ordered PHIPER    01/31/23 1443 01/31/23 1442  Type & Screen  STAT          PER Godoy              Virtual Visit Note: The provider triage portion of this emergency department evaluation and documentation was performed via [email or web address], a HIPAA-compliant telemedicine application, in concert with a tele-presenter in the room. A face to face patient evaluation with one of my colleagues will occur once the patient is placed in an emergency department room.      DISCLAIMER: This note was prepared with [employer] voice recognition transcription software. Garbled syntax, mangled pronouns, and other bizarre constructions may be attributed to that software system.

## 2023-01-31 NOTE — PROGRESS NOTES
I have personally discussed  this patient and agree with the resident, and agree with  their note as stated above with the following thoughts:    This note was generated with M*Identify voice recognition software. I apologize for any possible typographical errors.  Sounds like this gentleman's actually having rectal bleeding.  I would recommend checking the CBC.  The resident says he looks pale and has a lot of fatigue.  We will rule out anemia.  Understands H&H to come back showing significant anemia so we are going to actually have him send off the hospital for evaluation and transfusion.

## 2023-01-31 NOTE — Clinical Note
Diagnosis: Symptomatic anemia [[id number]]   Future Attending Provider: CHRIST SIMMONS [[id number]]   Admitting Provider:: CHRIST SIMMONS [[id number]]

## 2023-01-31 NOTE — ED PROVIDER NOTES
Encounter Date: 1/31/2023       History     Chief Complaint   Patient presents with    GI Bleeding     Pt has hx of IBS, and pt reports bleeding in stool x 6 mths, pt appears pale and MM dry, pt had blood drawn yesterday and H/H-6/23.8, + generalized weakness reported      32-year-old male with past medical history notable for history of a bowel obstruction as an infant resulting in a colectomy who presents with complaint of anemia.  Patient says that he established care with a primary care physician yesterday and was called today after he was told that he had a low hemoglobin.  He complains of rectal bleeding for the past 6 months.  Notes that it is usually red mixed into his stool.      Review of patient's allergies indicates:   Allergen Reactions    Sulfa (sulfonamide antibiotics) Other (See Comments)      Pt not sure of reaction     Past Medical History:   Diagnosis Date    ADHD (attention deficit hyperactivity disorder)     Anxiety     Bipolar affective disorder     Unspecified hemorrhoids      Past Surgical History:   Procedure Laterality Date    ADENOIDECTOMY      TONSILLECTOMY       Family History   Problem Relation Age of Onset    Colon cancer Mother     Pancreatic cancer Father      Social History     Tobacco Use    Smoking status: Some Days    Smokeless tobacco: Never   Substance Use Topics    Alcohol use: Yes     Comment: social     Drug use: Never     Review of Systems   Constitutional:  Negative for fever.   HENT:  Negative for sore throat.    Respiratory:  Negative for shortness of breath.    Cardiovascular:  Negative for chest pain.   Gastrointestinal:  Negative for nausea and vomiting.        Positive for rectal bleeding.    Genitourinary:  Negative for dysuria.   Musculoskeletal:  Negative for back pain.   Skin:  Negative for rash.   Neurological:  Negative for weakness.   Hematological:  Does not bruise/bleed easily.     Physical Exam     Initial Vitals [01/31/23 1418]   BP Pulse Resp Temp SpO2    121/60 96 20 97.8 °F (36.6 °C) 100 %      MAP       --         Physical Exam    Constitutional: He appears well-developed and well-nourished. No distress.   HENT:   Head: Normocephalic and atraumatic.   Eyes: EOM are normal.   Neck:   Normal range of motion.  Cardiovascular:  Normal rate.           Pulmonary/Chest: No respiratory distress.   Abdominal: Abdomen is soft. He exhibits no distension. There is no abdominal tenderness.   Genitourinary:    Genitourinary Comments: No gross blood on rectal exam.      Musculoskeletal:         General: Normal range of motion.      Cervical back: Normal range of motion.     Neurological: He is alert and oriented to person, place, and time.   Skin: Skin is warm and dry.   Psychiatric: He has a normal mood and affect.       ED Course   Critical Care    Date/Time: 1/31/2023 3:58 PM  Performed by: Delfina Stdodard MD  Authorized by: Delfina Stoddard MD   Total critical care time (exclusive of procedural time) : 0 minutes  Critical care was necessary to treat or prevent imminent or life-threatening deterioration of the following conditions: metabolic crisis.  Critical care was time spent personally by me on the following activities: interpretation of cardiac output measurements, evaluation of patient's response to treatment, examination of patient, obtaining history from patient or surrogate, ordering and performing treatments and interventions, ordering and review of laboratory studies, ordering and review of radiographic studies, pulse oximetry and review of old charts.      Labs Reviewed   CBC W/ AUTO DIFFERENTIAL - Abnormal; Notable for the following components:       Result Value    RBC 3.36 (*)     Hemoglobin 6.6 (*)     Hematocrit 24.1 (*)     MCV 72 (*)     MCH 19.6 (*)     MCHC 27.4 (*)     RDW 19.9 (*)     Lymph # 0.8 (*)     Gran % 78.0 (*)     Lymph % 9.4 (*)     All other components within normal limits   COMPREHENSIVE METABOLIC PANEL   URINALYSIS, REFLEX TO  URINE CULTURE   MAGNESIUM   SARS-COV-2 RDRP GENE   TYPE & SCREEN   PREPARE RBC SOFT          Imaging Results    None          Medications   0.9%  NaCl infusion (for blood administration) (has no administration in time range)   pantoprazole injection 80 mg (80 mg Intravenous Given 1/31/23 6578)     Medical Decision Making:   ED Management:  32-year-old male past medical history as above presents with complaint of anemia that was found on blood work done at his outpatient visit yesterday.  Upon arrival he is afebrile, stable vital signs.  Found to have a hemoglobin of 6.6.  Consented for blood and ordered 1 unit PRBCs.  No indication for CTA at this time.  Normal renal function.  Admitted to the [facility] for further management.           ED Course as of 01/31/23 1602   Tue Jan 31, 2023   1535 Hemoglobin(!): 6.6  Ordered 1 u PRBC's  [AT]      ED Course User Index  [AT] Delfina Stoddard MD                 Clinical Impression:   Final diagnoses:  [D64.9] Anemia        ED Disposition Condition    Observation Stable                Delfina Stoddard MD  01/31/23 7444

## 2023-02-01 ENCOUNTER — TELEPHONE (OUTPATIENT)
Dept: GASTROENTEROLOGY | Facility: CLINIC | Age: 33
End: 2023-02-01
Payer: MEDICARE

## 2023-02-01 PROBLEM — K92.1 HEMATOCHEZIA: Status: ACTIVE | Noted: 2023-02-01

## 2023-02-01 NOTE — H&P
[facility] Medicine H&P Note     Admitting Team: [facility] Hospitalist Team A  Attending Physician: Carolina Armstrong MD  Resident: Jyothi Wiggins MD  Intern: Jc Diaz DO    Date of Admit: 1/31/2023    Chief Complaint     Low hemoglobin on labs yesterday    Subjective:      History of Present Illness:  Mark Rich is a 32 y.o. male w/ pmh of IBS?, partial colectomy 2/2 obstruction as a child, ADHD, and bipolar affective disorder who presented to [facility] on 1/31/2023 after being alerted to having a low hemoglobin level and directed to present to the ED by his PCP today. The patient was in their usual state of health until approximately 1 week ago when he began experiencing increased weakness, SOB, and lightheadedness. He established care w/ a PCP yesterday during which time routine labs revealed him to have a Hgb of 6.0. His PCP called him today and instructed him to go to the ED for blood transfusion. He also states that he has been experiencing intermittent rectal bleeding for the past 2-3 years. He was told as a child that he has IBS and underwent a partial colectomy as a child d/t an obstruction.       Past Medical History:  Past Medical History:   Diagnosis Date    ADHD (attention deficit hyperactivity disorder)     Anxiety     Bipolar affective disorder     Unspecified hemorrhoids        Past Surgical History:  Past Surgical History:   Procedure Laterality Date    ADENOIDECTOMY      TONSILLECTOMY         Allergies:  Review of patient's allergies indicates:   Allergen Reactions    Sulfa (sulfonamide antibiotics) Other (See Comments)      Pt not sure of reaction       Home Medications:  Prior to Admission medications    Medication Sig Start Date End Date Taking? Authorizing Provider   ALPRAZolam (XANAX) 0.25 MG tablet Take 0.25 mg by mouth 2 (two) times daily. 12/2/22  Yes Historical Provider   busPIRone (BUSPAR) 10 MG tablet Take 10 mg by mouth 2 (two) times daily. 12/11/22  Yes  Historical Provider   dextroamphetamine-amphetamine (ADDERALL) 20 mg tablet Take 20 mg by mouth 2 (two) times daily. 19  Yes Historical Provider   FLUoxetine 40 MG capsule Take 80 mg by mouth every morning. 19  Yes Historical Provider   LATUDA 40 mg Tab tablet Take 40 mg by mouth once daily. 23  Yes Historical Provider   OLANZapine (ZYPREXA) 5 MG tablet Take 5 mg by mouth every evening. 23  Yes Historical Provider   venlafaxine (EFFEXOR-XR) 75 MG 24 hr capsule Take 75 mg by mouth every morning. 23  Yes Historical Provider   lamoTRIgine (LAMICTAL) 200 MG tablet Take 200 mg by mouth every evening. 19   Historical Provider   senna-docusate 8.6-50 mg (SENNA WITH DOCUSATE SODIUM) 8.6-50 mg per tablet Take 2 tablets by mouth once daily. 23   Silvina Story MD   cefdinir (OMNICEF) 300 MG capsule Take 300 mg by mouth every 12 (twelve) hours. 22  Historical Provider   fluticasone propionate (FLONASE) 50 mcg/actuation nasal spray 2 sprays by Each Nostril route. 22  Historical Provider   OLANZapine (ZYPREXA) 10 MG tablet Take 10 mg by mouth every evening. 19  Historical Provider       Family History:  Family History   Problem Relation Age of Onset    Colon cancer Mother     Pancreatic cancer Father        Social History:  Social History     Tobacco Use    Smoking status: Some Days    Smokeless tobacco: Never   Substance Use Topics    Alcohol use: Yes     Comment: social     Drug use: Never       Review of Systems:  Pertinent items are noted in HPI. All other systems are reviewed and are negative.    Health Maintaince :   Primary Care Physician: Silvina Story MD    Immunizations:   Tdap: unknown per pt  Flu: not UTD per pt  Pna: unknown per pt    Cancer Screening:  Colonoscopy: never     Objective:   Last 24 Hour Vital Signs:  BP  Min: 112/61  Max: 131/58  Temp  Av.3 °F (36.8 °C)  Min: 97.8 °F (36.6 °C)  Max: 98.4 °F (36.9 °C)  Pulse  Av.1  Min: 68   Max: 96  Resp  Av.3  Min: 17  Max: 20  SpO2  Av %  Min: 100 %  Max: 100 %  Weight  Av.4 kg (142 lb)  Min: 64.4 kg (142 lb)  Max: 64.4 kg (142 lb)  Body mass index is 22.92 kg/m².  No intake/output data recorded.    Physical Examination:  /82   Pulse 78   Temp 98.5 °F (36.9 °C) (Oral)   Resp 18   Wt 64.4 kg (142 lb)   SpO2 100%   BMI 22.92 kg/m²     General: WNWD adult male reclined in bed in NAD  HEENT: NC/AT, PERRLA, EOMI, mild conjunctival pallor, MMM  Neck: Supple, trachea midline, no JVD  CV: RRR, normal S1/S2, no m/c/g/r appreciated on auscultation  Resp: CTAB, no wheezing appreciated on auscultation, normal respiratory effort  Abdominal: Soft, non-distended, diffuse mild TTP, hypoactive Bsx4  Extremities: 2+ pulses throughout, ROM intact, no edema  Skin: warm, dry, intact  Neuro: AAOx4, CN II-XII intact, no focal deficits, spontaneously moves extremities  Psych: Pleasant mood, appropriate affect, cooperative w/ exam    Laboratory:  Most Recent Data:  CBC:   Lab Results   Component Value Date    WBC 8.11 2023    HGB 6.6 (L) 2023    HCT 24.1 (L) 2023     2023    MCV 72 (L) 2023    RDW 19.9 (H) 2023     BMP:   Lab Results   Component Value Date     2023    K 4.4 2023     2023    CO2 25 2023    BUN 13 2023    CREATININE 1.2 2023    GLU 72 2023    CALCIUM 9.6 2023    MG 1.9 2023     LFTs:   Lab Results   Component Value Date    PROT 7.8 2023    ALBUMIN 4.5 2023    BILITOT 0.2 2023    AST 22 2023    ALKPHOS 80 2023    ALT 19 2023     FLP:   Lab Results   Component Value Date    CHOL 154 2023    HDL 43 2023    LDLCALC 88.6 2023    TRIG 112 2023    CHOLHDL 27.9 2023     DM:   Lab Results   Component Value Date    LDLCALC 88.6 2023    CREATININE 1.2 2023     Thyroid:   Lab Results   Component Value Date     TSH 0.599 04/24/2019     Anemia:   Lab Results   Component Value Date    IRON 10 (L) 01/30/2023    TIBC 632 (H) 01/30/2023    FERRITIN 8 (L) 01/30/2023    [id number] 380 01/30/2023    FOLATE 12.5 01/30/2023     Urinalysis:   Lab Results   Component Value Date    COLORU Yellow 02/07/2007    SPECGRAV 1.015 02/07/2007    NITRITE Negative 02/07/2007    KETONESU Negative 02/07/2007    WBCUA 0-2 02/07/2007       Trended Lab Data:  Recent Labs   Lab 01/30/23  1639 01/31/23  1514   WBC 6.68 8.11   HGB 6.0* 6.6*   HCT 23.8* 24.1*    335   MCV 75* 72*   RDW 20.5* 19.9*    139   K 4.4 4.4    104   CO2 25 25   BUN 8 13   CREATININE 0.9 1.2   GLU 74 72   PROT 7.3 7.8   ALBUMIN 4.3 4.5   BILITOT 0.3 0.2   AST 23 22   ALKPHOS 75 80   ALT 16 19       Microbiology Data:  Microbiology Results (last 7 days)       Procedure Component Value Units Date/Time    Influenza A & B by Molecular [[id number]] Collected: 01/31/23 1816    Order Status: Completed Specimen: Nasopharyngeal Swab Updated: 01/31/23 1851     Influenza A, Molecular Negative     Influenza B, Molecular Negative     Flu A & B Source Nasal swab              Other Results:  EKG (my interpretation): No study to review.    Radiology:  Imaging Results              X-Ray Chest 1 View (Final result)  Result time 01/31/23 18:28:15      Final result by Alf Miller MD (01/31/23 18:28:15)                   Impression:      1. Mildly coarse interstitial attenuation, accentuated by habitus.  Early edema or other nonspecific pneumonitis can present in this fashion however and correlation is needed.      Electronically signed by: Alf Miller MD  Date:    01/31/2023  Time:    18:28               Narrative:    EXAMINATION:  XR CHEST 1 VIEW    CLINICAL HISTORY:  shortness of breath; Anemia, unspecified    TECHNIQUE:  Single frontal view of the chest was performed.    COMPARISON:  None    FINDINGS:  The cardiomediastinal silhouette is not enlarged.  There is no  pleural effusion.  The trachea is midline.  The lungs are symmetrically expanded bilaterally with mildly coarse interstitial attenuation.  No large focal consolidation seen.  There is no pneumothorax.  The osseous structures are unremarkable.                                       Assessment and Plan:     Mark Rich is a 32 y.o. male w/ pmh of IBS?, partial colectomy 2/2 obstruction as a child, ADHD, and bipolar affective disorder who presented to [facility] on 1/31/2023 after being alerted to having a low hemoglobin level and directed to present to the ED by his PCP today. Admitted to [facility] for symptomatic VALDEMAR 2/2 GIB. Transfusion started in ED. GI consulted for GIB w/ possible scope tomorrow.     Iron Deficiency Anemia 2/2 GI Bleed  Family Hx of Colon Cancer  Pt presents w/ Hgb of 6.0 at PCP clinic yesterday. 2-3 yr hx of rectal bleeding. Hx of colon cancer in mother diagnosed at age of 32. Pt has never had colonoscopy.  Plan:  - Hgb 6.6 in ED  - Transfusion of 1 U PRBC started in ED  - Protonix 80 mg IV x 1 dose, will continue 40 mg BID  - GI consulted, appreciate recs  - NPO at midnight for possible EGD/colonoscopy tomorrow  - Will repeat CBC s/p transfusion and continue to monitor    IBS  Pt states diagnosed w/ IBS as a child. Does not currently take any home meds for IBS, uses supplemental fiber. Recently prescribed Senna-Docusate by PCP but has not started taking.  Plan:  - No acute intervention at this time  - F/u w/ GI after discharge  - Will continue to monitor for symptoms    Bipolar Affective Disorder  Pt on Latuda 40 mg daily, Lamotrigine 200 mg nightly, and Zyprexa 5 mg nightly at home.  Plan:  - Continue home meds  - Continue to f/u w/ PCP upon discharge    Anxiety/Depresson  Takes Venlafaxine 75 mg daily, Fluoxetine 80 mg daily, Buspar 10 mg BID, and Xanax 0.25 mg BID PRN at home.  Plan:  - Continue home meds  - Continue to f/u w/ PCP upon discharge  - Will continue to  monitor for symptoms    ADHD  Normally takes Adderall 20 mg BID at home but hasn't taken in past week.  Plan:  - No acute intervention at this time  - Continue to f/u w/ PCP upon discharge    Healthcare Maintenance  Pt is not UTD w/ any vaccines and has never had a colonoscopy despite his mother's of colon cancer   Plan:  - F/u w/ PCP at discharge to receive past due vaccinations      Code Status: Full code  VTE Ppx: SCDs, no chemical ppx d/t active GIB  Diet: Regular, NPO at midnight    Disposition: Continuing transfusion, pending GI consult w/ possible EGD/colonoscopy tomorrow.      Jc Diaz,   [facility], PGY-1  [facility] Medicine Team A    [facility] Medicine Hospitalist Pager numbers:   [facility] Hospitalist Medicine Team A (Bimal/Nathaniel): [phone]  [facility] Hospitalist Medicine Team B (Willard/Branden):  [phone]

## 2023-02-01 NOTE — TELEPHONE ENCOUNTER
----- Message from Maryann Jimenez sent at 2/1/2023  3:23 PM CST -----  Contact: mom  Type:  Patient Returning Call    Who Called:mom  Who Left Message for Patient:lindyeric  Does the patient know what this is regarding?:procedure   Would the patient rather a call back or a response via [facility]? call  Best Call Back Number:   Additional Information:

## 2023-02-01 NOTE — CONSULTS
[facility]    CC: Anemia     HPI 32 y.o. with acute on chronic, symptomatic iron deficiency anemia associated with rectal bleeding and requiring blood transfusions. Reports he has noticed painless bright red blood both mixed into and on the outside of his stool with blood on the toilet paper with wiping for the last several months. He denies any weight loss, abdominal pain, n/v, dysphagia, but complains of occasional reflux symptoms. His mother is with him at the bedside who reports she has a history of colon cancer diagnosed twice, first at the age of 35. They deny any family history of IBD. The patient has never had any endoscopy.     Chart reviewed and summarized here, collateral obtained from nursing staff.    Past Medical History  Past Medical History:   Diagnosis Date    ADHD (attention deficit hyperactivity disorder)     Anxiety     Bipolar affective disorder     Unspecified hemorrhoids        Past Surgical History  Past Surgical History:   Procedure Laterality Date    ADENOIDECTOMY      TONSILLECTOMY         Social History  Social History     Tobacco Use    Smoking status: Some Days    Smokeless tobacco: Never   Substance Use Topics    Alcohol use: Yes     Comment: social     Drug use: Never       Family History  Family History   Problem Relation Age of Onset    Colon cancer Mother     Pancreatic cancer Father    Mother diagnosed with colon cancer at age of 35. No history of IBD    Review of Systems  General ROS: negative for chills, fever or weight loss  Psychological ROS: negative for hallucination, depression or suicidal ideation  Ophthalmic ROS: negative for blurry vision, photophobia or eye pain  ENT ROS: negative for epistaxis, sore throat or rhinorrhea  Respiratory ROS: no cough, shortness of breath, or wheezing  Cardiovascular ROS: no chest pain or dyspnea on exertion  Gastrointestinal ROS: no abdominal pain; + rectal bleeding  Genito-Urinary ROS: no dysuria, trouble voiding, or  hematuria  Musculoskeletal ROS: negative for gait disturbance or muscular weakness  Neurological ROS: no syncope or seizures; no ataxia  Dermatological ROS: negative for pruritis, rash and jaundice    Physical Examination  /60   Pulse 71   Temp 98.4 °F (36.9 °C) (Oral)   Resp 16   Wt 64.4 kg (142 lb)   SpO2 100%   BMI 22.92 kg/m²   General appearance: alert, cooperative, no distress  HENT: Normocephalic, atraumatic, neck symmetrical, no nasal discharge   Eyes: conjunctivae/corneas clear, PERRL, EOM's intact  Lungs: clear to auscultation bilaterally, no dullness to percussion bilaterally  Heart: regular rate and rhythm without rub; no displacement of the PMI   Abdomen: soft, non-tender; bowel sounds normoactive; no organomegaly.  Extremities: extremities symmetric; no clubbing, cyanosis, or edema  Integument: Skin color, texture, turgor normal; no rashes; hair distrubution normal  Neurologic: Alert and oriented X 3, normal strength, normal coordination and gait  Psychiatric: no pressured speech; normal affect; no evidence of impaired cognition     Labs:  Lab Results   Component Value Date    WBC 8.11 01/31/2023    HGB 6.6 (L) 01/31/2023    HCT 24.1 (L) 01/31/2023    MCV 72 (L) 01/31/2023     01/31/2023          Lab Results   Component Value Date    CREATININE 1.2 01/31/2023    BUN 13 01/31/2023     01/31/2023    K 4.4 01/31/2023     01/31/2023    CO2 25 01/31/2023      Lab Results   Component Value Date    ALT 19 01/31/2023    AST 22 01/31/2023    ALKPHOS 80 01/31/2023    BILITOT 0.2 01/31/2023    No results found for: INR, PROTIME    Collaborative information gained from the patient's mother at the bedside regarding her personal history of colon cancer.     Assessment:   35 year old male with iron deficiency anemia and rectal bleeding with a family history of early onset colon cancer. No previous endoscopy. Ddx includes malignancy vs IBD vs hemorrhoids vs angiodysplasia.     Plan:  -  EGD/ Colonoscopy tomorrow  - Bowel prep ordered   - CLD today and NPO at midnight  - PPI for now pending endoscopic findings   - Monitor hemoglobin and transfuse as indicated   - IV iron while inpatient and every other day p.o. iron       Discussed with Dr. Silva.    Mark Oseguera MD  [facility] Fellow, PGY- IV  Pager: [phone]

## 2023-02-01 NOTE — TELEPHONE ENCOUNTER
----- Message from Maryann Jimenez sent at 2/1/2023 12:29 PM CST -----  Contact: mom  Type:  Patient Requesting Referral    Who Called:mom   Referral to What Specialty:GI  Reason for Referral:bleeding (family history of cancer; concerned)  Does the patient want the referral with a specific physician?:Prefers Dr. Long   Is the specialist an [facility]?:drew  Patient Requesting a Response?:yes   Would the patient rather a call back or a response via [facility]? Call   Best Call Back Number:  Additional Information:  Referral is in   Ready to schedule procedure

## 2023-02-01 NOTE — PLAN OF CARE
GI Plan of Care Note    Patient seen and evaluated earlier in the day. Symptomatic iron deficiency anemia in the setting of painless rectal bleeding with a family history of early onset colon cancer.     Discussed endoscopic work up with patient and his mother at the bedside who are in agreement.    Formal consult note to follow.    Recommendations:  - EGD/ colonoscopy tomorrow   - Prep ordered   - CLD this evening then NPO at midnight     Mark Oseguera MD   GI Fellow

## 2023-02-01 NOTE — ED NOTES
Admit team at bedside, Dr Diaz. Team informed that pt would like to leave after blood infusion is complete. AMA form signed.

## 2023-02-01 NOTE — DISCHARGE SUMMARY
[facility] Medicine Discharge Summary    Primary Team: [facility] Hospitalist Team A  Attending Physician: Carolina Armstrong MD    Date of Admit: 1/31/2023  Date of AMA: 1/31/2023    Discharge to: AMA  Condition: Gaurded    Discharge Diagnoses     Patient Active Problem List   Diagnosis    Bipolar affective disorder    Anxiety    ADHD    History of partial colectomy    IBS (irritable bowel syndrome)    Symptomatic anemia    Iron deficiency anemia due to chronic blood loss    Current tobacco use    Recreational drug use       Consultants and Procedures     Consultants:  GI    Procedures:   None    Imaging:  None    Brief History of Present Illness      Mark Rich is a 32 y.o. male w/ pmh of IBS?, partial colectomy 2/2 obstruction as a child, ADHD, and bipolar affective disorder who presented to [facility] on 1/31/2023 after being alerted to having a low hemoglobin level and directed to present to the ED by his PCP today. The patient was in their usual state of health until approximately 1 week ago when he began experiencing increased weakness, SOB, and lightheadedness. He established care w/ a PCP yesterday during which time routine labs revealed him to have a Hgb of 6.0. His PCP called him today and instructed him to go to the ED for blood transfusion. He also states that he had been experiencing intermittent rectal bleeding for the past 2-3 years. He was told as a child that he has IBS and underwent a partial colectomy as a child d/t an obstruction.      For the full HPI please refer to the History & Physical from this admission.      Patient left AMA day of admission prior to full evaluation and management.     Hospital Course By Problem with Pertinent Findings     Iron Deficiency Anemia 2/2 GI Bleed  Family Hx of Colon Cancer  Pt presented w/ Hgb of 6.0 at PCP clinic yesterday. 2-3 yr hx of rectal bleeding. Hx of colon cancer in mother diagnosed at age of 32. Pt had never had  colonoscopy.  Plan:  - Hgb 6.6 in ED  - Transfusion of 1 U PRBC started in ED  - Protonix 80 mg IV x 1 dose, will continue 40 mg BID  - GI consulted  - patient left AMA from ED shortly after admission      IBS  Pt stated diagnosed w/ IBS as a child. Does not currently take any home meds for IBS, uses supplemental fiber. Recently prescribed Senna-Docusate by PCP but has not started taking.  Plan:  - No acute intervention at this time  - patient left AMA from ED shortly after admission      Bipolar Affective Disorder  Pt on Latuda 40 mg daily, Lamotrigine 200 mg nightly, and Zyprexa 5 mg nightly at home.  Plan:  - Continued home meds  - patient left AMA from ED shortly after admission      Anxiety/Depresson  Takes Venlafaxine 75 mg daily, Fluoxetine 80 mg daily, Buspar 10 mg BID, and Xanax 0.25 mg BID PRN at home.  Plan:  - Continued home meds  - patient left AMA from ED shortly after admission      ADHD  Normally takes Adderall 20 mg BID at home but hasn't taken in past week.  Plan:  - No acute intervention at this time  - patient left AMA from ED shortly after admission      Healthcare Maintenance  Pt is not UTD w/ any vaccines and has never had a colonoscopy despite his mother's of colon cancer   Plan:  - F/u w/ PCP at discharge to receive past due vaccinations  -- patient left AMA from ED shortly after admission     Mikey Ortiz MD  [facility] -II

## 2023-02-01 NOTE — TELEPHONE ENCOUNTER
Called patient to get him schedule and talk over his concerns.  Patient did not answer,  voice mail box was full was not able to leave voice message.

## 2023-02-01 NOTE — PLAN OF CARE
Alerted by nursing staff that pt did not want to stay and wanted to leave AMA. Discussed the situation w/ the pt at bedside and explained all of the risks of leaving AMA in the setting of an active GI bleed including serious complications that could potentially cause significant bodily harm or death. Pt expressed understanding of the risks presented and chose to leave AMA stating that he would f/u w/ GI and his PCP as an outpatient. AMA form signed and witnessed.    Jc Diaz,   [facility] Internal Medicine, PGY-1  [facility] Medicine Team A

## 2023-02-01 NOTE — TELEPHONE ENCOUNTER
----- Message from Ishmael Troy sent at 2/1/2023  4:15 PM CST -----  Type:  Patient Returning Call    Who Called:pt  Who Left Message for Patient:Carolynn Dc  Does the patient know what this is regarding?:schedule   Would the patient rather a call back or a response via [facility]? call  Best Call Back Number:[phone]  Additional Information: she will be by the phone

## 2023-02-02 ENCOUNTER — TELEPHONE (OUTPATIENT)
Dept: GASTROENTEROLOGY | Facility: CLINIC | Age: 33
End: 2023-02-02
Payer: MEDICARE

## 2023-02-02 NOTE — TELEPHONE ENCOUNTER
Spoke with patients mother. Patient went to the ER and was given a blood transfusion. They tried to do the colonoscopy and patient declined at that time. Patient mother would like for him to have a colonoscopy with Dr. Long, because she is an established patient of his. I informed patient mother that Dr. Long is booked until April for appointments. They would like to see a NP sooner. Appt scheduled on 2/3/23 at 10:40am with Genna Higginbotham NP.

## 2023-02-02 NOTE — TELEPHONE ENCOUNTER
----- Message from Marge Perez sent at 2/1/2023  4:24 PM CST -----  Type:  Patient Returning Call    Who Called: Pt Mother  Who Left Message for Patient: Carolynn  Does the patient know what this is regarding?: colonoscopy appt  Would the patient rather a call back or a response via [facility]?  call  Best Call Back Number: [phone]  Additional Information:

## 2023-02-03 ENCOUNTER — OFFICE VISIT (OUTPATIENT)
Dept: GASTROENTEROLOGY | Facility: CLINIC | Age: 33
End: 2023-02-03
Payer: MEDICARE

## 2023-02-03 VITALS — WEIGHT: 157.19 LBS | BODY MASS INDEX: 25.37 KG/M2

## 2023-02-03 DIAGNOSIS — K92.1 HEMATOCHEZIA: Primary | ICD-10-CM

## 2023-02-03 DIAGNOSIS — Z80.0 FAMILY HISTORY OF COLON CANCER: ICD-10-CM

## 2023-02-03 PROCEDURE — 99999 PR PBB SHADOW E&M-EST. PATIENT-LVL III: ICD-10-PCS | Mod: PBBFAC,,,

## 2023-02-03 PROCEDURE — 3008F PR BODY MASS INDEX (BMI) DOCUMENTED: ICD-10-PCS | Mod: CPTII,S$GLB,,

## 2023-02-03 PROCEDURE — 99214 OFFICE O/P EST MOD 30 MIN: CPT | Mod: S$GLB,,,

## 2023-02-03 PROCEDURE — 1159F PR MEDICATION LIST DOCUMENTED IN MEDICAL RECORD: ICD-10-PCS | Mod: CPTII,S$GLB,,

## 2023-02-03 PROCEDURE — 1159F MED LIST DOCD IN RCRD: CPT | Mod: CPTII,S$GLB,,

## 2023-02-03 PROCEDURE — 99999 PR PBB SHADOW E&M-EST. PATIENT-LVL III: CPT | Mod: PBBFAC,,,

## 2023-02-03 PROCEDURE — 99214 PR OFFICE/OUTPT VISIT, EST, LEVL IV, 30-39 MIN: ICD-10-PCS | Mod: S$GLB,,,

## 2023-02-03 PROCEDURE — 3008F BODY MASS INDEX DOCD: CPT | Mod: CPTII,S$GLB,,

## 2023-02-03 RX ORDER — SODIUM, POTASSIUM,MAG SULFATES 17.5-3.13G
1 SOLUTION, RECONSTITUTED, ORAL ORAL DAILY
Qty: 1 KIT | Refills: 0 | Status: ON HOLD | OUTPATIENT
Start: 2023-02-03 | End: 2023-03-20 | Stop reason: HOSPADM

## 2023-02-03 NOTE — PATIENT INSTRUCTIONS
SUPREP Instructions            [facility] [address]      Clinic Office [phone]      Endoscopy Lab [phone]        Endo will call u 2-3 days before your procedure with you time of arrival and when it will start.     You are scheduled for a Colonoscopy with    on Tuesday 03/07/23 at [facility] in [place].        Check in at the Hospital -1st floor, Information desk.      Call [phone] to reschedule.            An adult friend/family member must come with you to drive you home.  You cannot drive, take a taxi, Uber/Lyft or bus to leave the Endoscopy Center alone.  If you do not have someone to drive you home, your test will be cancelled.            Please follow the directions of your doctor if you take any pills that thin your blood. If you take these meds: Aggrenox, Brilinta, Effient, Eliquis, Lovenox, Plavix, Pletal, Pradaxa, Ticilid, Xarelto or Coumadin, let the doctor's office know.            DON'T: On the morning of the test do not take insulin or pills for diabetes.            DO: On the morning of the test, do take any pills for blood pressure, heart, anti-rejection and or seizures with a small sip of water. Bring any inhalers with you.            To have a good prep, you must follow these instructions - please do not use the directions from the pharmacy.            The doctor will send a prescription for the SUPREP.                  The Day Before the test:            You can only drink CLEAR LIQUIDS the whole day before your test.  You can't eat any food for the whole day.            You CAN have:      Water, Coffee or decaf coffee (no milk or cream)      Tea      Soft drinks - regular and sugar free      Jello (green or yellow)      Apple Juice, white grape juice, white cranberry juice      Gatorade, Power Aid, Crystal Light, Selwyn Aid      Lemonade and Limeade      Bouillon, clear soup      Snowball, popsicles      YOU CAN'T DRINK ANYTHING RED,  PURPLE ORANGE OR BLUE      YOU CAN'T DRINK ALCOHOL      ONLY DRINK WHAT IS ON THE LIST                  At 5 pm the night before your test:            Pour the 1st bottle of SUPREP into the cup provided in the box. Add water to the line on the cup and mix well.  Drink the whole cup and then drink 2 more full cups of water over 1 hour.      This can be easier to drink if it is cold. You can mix it 20 minutes ahead of time and place in the refrigerator before you drink it.  You must drink it within 30-45 minutes of mixing it.  Do NOT pour the drink over ice.  You can drink it with a straw.            The Day of the test - We will call you 2 days before your test to tell you what time to get there.            5 hours before you come to the hospital (this may be in the middle of the night)      Pour the 2nd bottle of SUPREP into the cup provided in the box. Add water to the line on the cup and mix well.  Drink the whole cup and then drink 2 more full cups of water over 1 hour.      It might be easier to drink if it is cold. You can mix it 20 minutes ahead of time and place in the refrigerator before you drink it.  You must drink it within 30-45 minutes of mixing it.  Do NOT pour the drink over ice.  You can drink it with a straw.            YOU CAN'T EAT OR DRINK ANYTHING ELSE ONCE YOU FINISH THE PREP            Leave all valuables and jewelry at home. You will be at the hospital for 2-4 hours.            Call the Endoscopy department at [phone] with any questions about

## 2023-02-03 NOTE — PROGRESS NOTES
"     GASTROENTEROLOGY CLINIC NOTE    Reason for visit: The primary encounter diagnosis was Hematochezia. A diagnosis of Family history of colon cancer was also pertinent to this visit.  Referring provider/PCP: Silvina Story MD    HPI:  Mark Rich is a 32 y.o. male here today for hematochezia. He went to the ER earlier this week with fatigue and weakness. Hgb was 6.6- transfused 1uPRBC. It was recommend he undergo EGD/colonoscopy, but he declined and left AMA. He presents today, no longer feeling weak or fatigued, still with rectal bleeding. This has been ongoing for about a year. He notes that it only occurs when having BM's, about 5-6x/week. Blood is bright red with some clots noted. DIAN negative for gross blood in ER. Pt thinks he has hemorrhoids- endorses intermittent "pressure" and pain with straining. He underwent a colon surgery as a child for an obstruction, he is unsure what surgery entailed, he states he never had an ostomy. Mother did have colon CA twice, first diagnosed in her 30's. No history of IBD.     Prior Endoscopy: N/A  EGD:  Colon:    (Portions of this note were dictated using voice recognition software and may contain dictation related errors in spelling/grammar/syntax not found on text review)    Review of Systems   Constitutional:  Negative for weight loss.   Gastrointestinal:  Positive for blood in stool. Negative for abdominal pain and constipation.     Past Medical History: has a past medical history of ADHD (attention deficit hyperactivity disorder), Anxiety, Bipolar affective disorder, and Unspecified hemorrhoids.    Past Surgical History: has a past surgical history that includes Tonsillectomy and Adenoidectomy.    Home medications:   Current Outpatient Medications on File Prior to Visit   Medication Sig Dispense Refill    ALPRAZolam (XANAX) 0.25 MG tablet Take 0.25 mg by mouth 2 (two) times daily.      busPIRone (BUSPAR) 10 MG tablet Take 10 mg by mouth 2 (two) times daily.      " dextroamphetamine-amphetamine (ADDERALL) 20 mg tablet Take 20 mg by mouth 2 (two) times daily.  0    FLUoxetine 40 MG capsule Take 80 mg by mouth every morning.  0    lamoTRIgine (LAMICTAL) 200 MG tablet Take 200 mg by mouth every evening.  0    LATUDA 40 mg Tab tablet Take 40 mg by mouth once daily.      OLANZapine (ZYPREXA) 5 MG tablet Take 5 mg by mouth every evening.      senna-docusate 8.6-50 mg (SENNA WITH DOCUSATE SODIUM) 8.6-50 mg per tablet Take 2 tablets by mouth once daily. 180 tablet 3    venlafaxine (EFFEXOR-XR) 75 MG 24 hr capsule Take 75 mg by mouth every morning.       No current facility-administered medications on file prior to visit.       Vital signs:  Wt 71.3 kg (157 lb 3 oz)   BMI 25.37 kg/m²     Physical Exam  Abdominal:      General: Abdomen is flat.      Palpations: Abdomen is soft.      Tenderness: There is abdominal tenderness in the right lower quadrant and left lower quadrant.   Neurological:      Mental Status: He is alert.       I have reviewed associated labs, imaging and notes.     Assessment:  1. Hematochezia    2. Family history of colon cancer      Hematochezia   Ongoing for about a year, occurs almost daily   Symptomatic anemia- 6.6 earlier this week, given 1 unit.   Pt asymptomatic today  FH colon cancer   Mother had twice, diagnosed first in her 30's  Pt had colon resection as a child   Unsure of what surgery entailed, never had ostomy     Plan:  Orders Placed This Encounter    sodium,potassium,mag sulfates (SUPREP BOWEL PREP KIT) 17.5-3.13-1.6 gram SolR    Case Request Endoscopy: COLONOSCOPY     First available colonoscopy   suprep    Emphasized strict ER precautions if symptoms return    [facility]

## 2023-02-03 NOTE — H&P (VIEW-ONLY)
"     GASTROENTEROLOGY CLINIC NOTE    Reason for visit: The primary encounter diagnosis was Hematochezia. A diagnosis of Family history of colon cancer was also pertinent to this visit.  Referring provider/PCP: Silvina Story MD    HPI:  Mark Rich is a 32 y.o. male here today for hematochezia. He went to the ER earlier this week with fatigue and weakness. Hgb was 6.6- transfused 1uPRBC. It was recommend he undergo EGD/colonoscopy, but he declined and left AMA. He presents today, no longer feeling weak or fatigued, still with rectal bleeding. This has been ongoing for about a year. He notes that it only occurs when having BM's, about 5-6x/week. Blood is bright red with some clots noted. DIAN negative for gross blood in ER. Pt thinks he has hemorrhoids- endorses intermittent "pressure" and pain with straining. He underwent a colon surgery as a child for an obstruction, he is unsure what surgery entailed, he states he never had an ostomy. Mother did have colon CA twice, first diagnosed in her 30's. No history of IBD.     Prior Endoscopy: N/A  EGD:  Colon:    (Portions of this note were dictated using voice recognition software and may contain dictation related errors in spelling/grammar/syntax not found on text review)    Review of Systems   Constitutional:  Negative for weight loss.   Gastrointestinal:  Positive for blood in stool. Negative for abdominal pain and constipation.     Past Medical History: has a past medical history of ADHD (attention deficit hyperactivity disorder), Anxiety, Bipolar affective disorder, and Unspecified hemorrhoids.    Past Surgical History: has a past surgical history that includes Tonsillectomy and Adenoidectomy.    Home medications:   Current Outpatient Medications on File Prior to Visit   Medication Sig Dispense Refill    ALPRAZolam (XANAX) 0.25 MG tablet Take 0.25 mg by mouth 2 (two) times daily.      busPIRone (BUSPAR) 10 MG tablet Take 10 mg by mouth 2 (two) times daily.      " dextroamphetamine-amphetamine (ADDERALL) 20 mg tablet Take 20 mg by mouth 2 (two) times daily.  0    FLUoxetine 40 MG capsule Take 80 mg by mouth every morning.  0    lamoTRIgine (LAMICTAL) 200 MG tablet Take 200 mg by mouth every evening.  0    LATUDA 40 mg Tab tablet Take 40 mg by mouth once daily.      OLANZapine (ZYPREXA) 5 MG tablet Take 5 mg by mouth every evening.      senna-docusate 8.6-50 mg (SENNA WITH DOCUSATE SODIUM) 8.6-50 mg per tablet Take 2 tablets by mouth once daily. 180 tablet 3    venlafaxine (EFFEXOR-XR) 75 MG 24 hr capsule Take 75 mg by mouth every morning.       No current facility-administered medications on file prior to visit.       Vital signs:  Wt 71.3 kg (157 lb 3 oz)   BMI 25.37 kg/m²     Physical Exam  Abdominal:      General: Abdomen is flat.      Palpations: Abdomen is soft.      Tenderness: There is abdominal tenderness in the right lower quadrant and left lower quadrant.   Neurological:      Mental Status: He is alert.       I have reviewed associated labs, imaging and notes.     Assessment:  1. Hematochezia    2. Family history of colon cancer      Hematochezia   Ongoing for about a year, occurs almost daily   Symptomatic anemia- 6.6 earlier this week, given 1 unit.   Pt asymptomatic today  FH colon cancer   Mother had twice, diagnosed first in her 30's  Pt had colon resection as a child   Unsure of what surgery entailed, never had ostomy     Plan:  Orders Placed This Encounter    sodium,potassium,mag sulfates (SUPREP BOWEL PREP KIT) 17.5-3.13-1.6 gram SolR    Case Request Endoscopy: COLONOSCOPY     First available colonoscopy   suprep    Emphasized strict ER precautions if symptoms return    [facility]

## 2023-02-06 ENCOUNTER — TELEPHONE (OUTPATIENT)
Dept: GASTROENTEROLOGY | Facility: CLINIC | Age: 33
End: 2023-02-06
Payer: MEDICARE

## 2023-02-06 ENCOUNTER — TELEPHONE (OUTPATIENT)
Dept: ENDOSCOPY | Facility: HOSPITAL | Age: 33
End: 2023-02-06
Payer: MEDICARE

## 2023-02-06 NOTE — TELEPHONE ENCOUNTER
Spoke with patient about arrival time @ 1330.     Prep instructions reviewed: the day before the procedure, follow a clear liquid diet all day, then start the first 1/2 of prep at 5pm and take 2nd 1/2 of prep @ 0830.  Pt must be completely NPO when prep completed @ 1030.              Medications: Do not take Insulin or oral diabetic medications the day of the procedure.  Take as prescribed: heart, seizure and blood pressure medication in the morning with a sip of water (less than an ounce).  Take any breathing medications and bring inhalers to hospital with you Leave all valuables and jewelry at home.     Wear comfortable clothes to procedure to change into hospital gown You cannot drive for 24 hours after your procedure because you will receive sedation for your procedure to make you comfortable.  A ride must be provided at discharge.

## 2023-02-06 NOTE — TELEPHONE ENCOUNTER
----- Message from Hangdexter Ramirez sent at 2/6/2023  9:57 AM CST -----  Contact: pt  Type: Requesting to speak with nurse        Who Called: PT  Regarding: states he was offered an earlier time and he didn't understand the message. Regarding procedure for 2/7   Would the patient rather a call back or a response via [facility]? Call back  Best Call Back Number: [phone]  Additional Information:

## 2023-02-07 ENCOUNTER — TELEPHONE (OUTPATIENT)
Dept: GASTROENTEROLOGY | Facility: HOSPITAL | Age: 33
End: 2023-02-07
Payer: MEDICARE

## 2023-02-07 ENCOUNTER — ANESTHESIA EVENT (OUTPATIENT)
Dept: ENDOSCOPY | Facility: HOSPITAL | Age: 33
End: 2023-02-07
Payer: MEDICARE

## 2023-02-07 ENCOUNTER — ANESTHESIA (OUTPATIENT)
Dept: ENDOSCOPY | Facility: HOSPITAL | Age: 33
End: 2023-02-07
Payer: MEDICARE

## 2023-02-07 ENCOUNTER — HOSPITAL ENCOUNTER (OUTPATIENT)
Facility: HOSPITAL | Age: 33
Discharge: HOME | End: 2023-02-07
Attending: INTERNAL MEDICINE | Admitting: INTERNAL MEDICINE
Payer: MEDICARE

## 2023-02-07 DIAGNOSIS — K62.5 RECTAL BLEED: ICD-10-CM

## 2023-02-07 DIAGNOSIS — K64.8 INTERNAL HEMORRHOID, BLEEDING: Primary | ICD-10-CM

## 2023-02-07 PROCEDURE — 88305 TISSUE EXAM BY PATHOLOGIST: ICD-10-PCS | Mod: 26,,, | Performed by: PATHOLOGY

## 2023-02-07 PROCEDURE — 63600175 PHARM REV CODE 636 W HCPCS: Performed by: NURSE ANESTHETIST, CERTIFIED REGISTERED

## 2023-02-07 PROCEDURE — 37000008 HC ANESTHESIA 1ST 15 MINUTES: Performed by: INTERNAL MEDICINE

## 2023-02-07 PROCEDURE — 25000003 PHARM REV CODE 250: Performed by: NURSE ANESTHETIST, CERTIFIED REGISTERED

## 2023-02-07 PROCEDURE — D9220A PRA ANESTHESIA: ICD-10-PCS | Mod: CRNA,,, | Performed by: NURSE ANESTHETIST, CERTIFIED REGISTERED

## 2023-02-07 PROCEDURE — 45380 COLONOSCOPY AND BIOPSY: CPT | Mod: 59,,, | Performed by: INTERNAL MEDICINE

## 2023-02-07 PROCEDURE — 45380 COLONOSCOPY AND BIOPSY: CPT | Mod: 59 | Performed by: INTERNAL MEDICINE

## 2023-02-07 PROCEDURE — D9220A PRA ANESTHESIA: Mod: CRNA,,, | Performed by: NURSE ANESTHETIST, CERTIFIED REGISTERED

## 2023-02-07 PROCEDURE — 25000003 PHARM REV CODE 250: Performed by: INTERNAL MEDICINE

## 2023-02-07 PROCEDURE — 27201012 HC FORCEPS, HOT/COLD, DISP: Performed by: INTERNAL MEDICINE

## 2023-02-07 PROCEDURE — 45385 COLONOSCOPY W/LESION REMOVAL: CPT | Mod: ,,, | Performed by: INTERNAL MEDICINE

## 2023-02-07 PROCEDURE — 37000009 HC ANESTHESIA EA ADD 15 MINS: Performed by: INTERNAL MEDICINE

## 2023-02-07 PROCEDURE — 45385 COLONOSCOPY W/LESION REMOVAL: CPT | Performed by: INTERNAL MEDICINE

## 2023-02-07 PROCEDURE — 45380 PR COLONOSCOPY,BIOPSY: ICD-10-PCS | Mod: 59,,, | Performed by: INTERNAL MEDICINE

## 2023-02-07 PROCEDURE — 27201089 HC SNARE, DISP (ANY): Performed by: INTERNAL MEDICINE

## 2023-02-07 PROCEDURE — D9220A PRA ANESTHESIA: Mod: ANES,,, | Performed by: ANESTHESIOLOGY

## 2023-02-07 PROCEDURE — 88305 TISSUE EXAM BY PATHOLOGIST: CPT | Mod: 26,,, | Performed by: PATHOLOGY

## 2023-02-07 PROCEDURE — 88305 TISSUE EXAM BY PATHOLOGIST: CPT | Performed by: PATHOLOGY

## 2023-02-07 PROCEDURE — D9220A PRA ANESTHESIA: ICD-10-PCS | Mod: ANES,,, | Performed by: ANESTHESIOLOGY

## 2023-02-07 PROCEDURE — 45385 PR COLONOSCOPY,REMV LESN,SNARE: ICD-10-PCS | Mod: ,,, | Performed by: INTERNAL MEDICINE

## 2023-02-07 RX ORDER — LIDOCAINE HCL/PF 100 MG/5ML
SYRINGE (ML) INTRAVENOUS
Status: DISCONTINUED | OUTPATIENT
Start: 2023-02-07 | End: 2023-02-07

## 2023-02-07 RX ORDER — ONDANSETRON 2 MG/ML
4 INJECTION INTRAMUSCULAR; INTRAVENOUS ONCE AS NEEDED
Status: CANCELLED | OUTPATIENT
Start: 2023-02-07 | End: 2034-07-06

## 2023-02-07 RX ORDER — SODIUM CHLORIDE 9 MG/ML
INJECTION, SOLUTION INTRAVENOUS CONTINUOUS
Status: DISCONTINUED | OUTPATIENT
Start: 2023-02-07 | End: 2023-02-07 | Stop reason: HOSPADM

## 2023-02-07 RX ORDER — PROPOFOL 10 MG/ML
INJECTION, EMULSION INTRAVENOUS CONTINUOUS PRN
Status: DISCONTINUED | OUTPATIENT
Start: 2023-02-07 | End: 2023-02-07

## 2023-02-07 RX ORDER — PROPOFOL 10 MG/ML
INJECTION, EMULSION INTRAVENOUS
Status: DISCONTINUED | OUTPATIENT
Start: 2023-02-07 | End: 2023-02-07

## 2023-02-07 RX ORDER — SODIUM CHLORIDE 0.9 % (FLUSH) 0.9 %
10 SYRINGE (ML) INJECTION
Status: DISCONTINUED | OUTPATIENT
Start: 2023-02-07 | End: 2023-02-07 | Stop reason: HOSPADM

## 2023-02-07 RX ORDER — SODIUM CHLORIDE 0.9 % (FLUSH) 0.9 %
3 SYRINGE (ML) INJECTION
Status: CANCELLED | OUTPATIENT
Start: 2023-02-07

## 2023-02-07 RX ADMIN — SODIUM CHLORIDE: 0.9 INJECTION, SOLUTION INTRAVENOUS at 02:02

## 2023-02-07 RX ADMIN — LIDOCAINE HYDROCHLORIDE 50 MG: 20 INJECTION, SOLUTION INTRAVENOUS at 02:02

## 2023-02-07 RX ADMIN — PROPOFOL 70 MG: 10 INJECTION, EMULSION INTRAVENOUS at 02:02

## 2023-02-07 RX ADMIN — PROPOFOL 30 MG: 10 INJECTION, EMULSION INTRAVENOUS at 02:02

## 2023-02-07 RX ADMIN — GLYCOPYRROLATE 0.2 MG: 0.2 INJECTION, SOLUTION INTRAMUSCULAR; INTRAVITREAL at 02:02

## 2023-02-07 RX ADMIN — PROPOFOL 150 MCG/KG/MIN: 10 INJECTION, EMULSION INTRAVENOUS at 02:02

## 2023-02-07 NOTE — ANESTHESIA PREPROCEDURE EVALUATION
02/07/2023    Pre-operative evaluation for Procedure(s) (LRB):  COLONOSCOPY (N/A)    Mark Rich is a 32 y.o. male     Patient Active Problem List   Diagnosis    Bipolar affective disorder    Anxiety    ADHD    History of partial colectomy    IBS (irritable bowel syndrome)    Symptomatic anemia    Iron deficiency anemia due to chronic blood loss    Current tobacco use    Recreational drug use    Hematochezia       Review of patient's allergies indicates:   Allergen Reactions    Sulfa (sulfonamide antibiotics) Other (See Comments)      Pt not sure of reaction       No current facility-administered medications on file prior to encounter.     Current Outpatient Medications on File Prior to Encounter   Medication Sig Dispense Refill    ALPRAZolam (XANAX) 0.25 MG tablet Take 0.25 mg by mouth 2 (two) times daily.      busPIRone (BUSPAR) 10 MG tablet Take 10 mg by mouth 2 (two) times daily.      dextroamphetamine-amphetamine (ADDERALL) 20 mg tablet Take 20 mg by mouth 2 (two) times daily.  0    FLUoxetine 40 MG capsule Take 80 mg by mouth every morning.  0    lamoTRIgine (LAMICTAL) 200 MG tablet Take 200 mg by mouth every evening.  0    LATUDA 40 mg Tab tablet Take 40 mg by mouth once daily.      senna-docusate 8.6-50 mg (SENNA WITH DOCUSATE SODIUM) 8.6-50 mg per tablet Take 2 tablets by mouth once daily. 180 tablet 3    sodium,potassium,mag sulfates (SUPREP BOWEL PREP KIT) 17.5-3.13-1.6 gram SolR Take 177 mLs by mouth once daily. 1 kit 0    venlafaxine (EFFEXOR-XR) 75 MG 24 hr capsule Take 75 mg by mouth every morning.      OLANZapine (ZYPREXA) 5 MG tablet Take 5 mg by mouth every evening.         Past Surgical History:   Procedure Laterality Date    ADENOIDECTOMY      TONSILLECTOMY         Social History     Socioeconomic History    Marital status: Single   Tobacco Use    Smoking  status: Some Days    Smokeless tobacco: Never   Substance and Sexual Activity    Alcohol use: Not Currently     Comment: social     Drug use: Never    Sexual activity: Yes     Partners: Female   Social History Narrative              CBC: No results for input(s): WBC, RBC, HGB, HCT, PLT, MCV, MCH, MCHC in the last 72 hours.    CMP: No results for input(s): NA, K, CL, CO2, BUN, CREATININE, GLU, MG, PHOS, CALCIUM, ALBUMIN, PROT, ALKPHOS, ALT, AST, BILITOT in the last 72 hours.    INR  No results for input(s): PT, INR, PROTIME, APTT in the last 72 hours.        Diagnostic Studies:      EKD Echo:  No results found for this or any previous visit.        Pre-op Assessment    I have reviewed the Patient Summary Reports.     I have reviewed the Nursing Notes. I have reviewed the NPO Status.   I have reviewed the Medications.     Review of Systems  Anesthesia Hx:  Denies Family Hx of Anesthesia complications.   Denies Personal Hx of Anesthesia complications.   Hematology/Oncology:         -- Anemia:   Cardiovascular:   Exercise tolerance: good Denies Dysrhythmias.   Denies Angina.  Denies CORRIGAN.    Pulmonary:   Denies COPD.  Denies Asthma.    Renal/:   Denies Chronic Renal Disease.     Hepatic/GI:   Denies GERD.    Neurological:   Denies CVA. Denies Seizures.    Endocrine:   Denies Diabetes.    Psych:   ADHD         Physical Exam  General: Well nourished, Cooperative, Alert and Oriented    Airway:  Mallampati: II / I  Mouth Opening: Normal  TM Distance: Normal  Tongue: Normal  Neck ROM: Normal ROM    Dental:  Intact    Chest/Lungs:  Clear to auscultation, Normal Respiratory Rate    Heart:  Rate: Normal  Rhythm: Regular Rhythm        Anesthesia Plan  Type of Anesthesia, risks & benefits discussed:    Anesthesia Type: Gen Natural Airway  Intra-op Monitoring Plan: Standard ASA Monitors  Post Op Pain Control Plan: multimodal analgesia  Induction:  IV  Informed Consent: Informed consent signed with the Patient  and all parties understand the risks and agree with anesthesia plan.  All questions answered. Patient consented to blood products? Yes  ASA Score: 1  Day of Surgery Review of History & Physical: H&P Update referred to the surgeon/provider.    Ready For Surgery From Anesthesia Perspective.     .

## 2023-02-07 NOTE — TRANSFER OF CARE
"Anesthesia Transfer of Care Note    Patient: Mark Rich    Procedure(s) Performed: Procedure(s) (LRB):  COLONOSCOPY (N/A)    Patient location: PACU    Anesthesia Type: general    Transport from OR: Transported from OR on room air with adequate spontaneous ventilation    Post pain: adequate analgesia    Post assessment: no apparent anesthetic complications    Post vital signs: stable    Level of consciousness: awake and alert    Nausea/Vomiting: no nausea/vomiting    Complications: none, other (see comments), wretching and vomiting clear fluid during procedure; no sign of aspiration    Transfer of care protocol was followed      Last vitals:   Visit Vitals  /75   Pulse 95   Temp 36.7 °C (98 °F)   Resp 17   Ht 5' 6" (1.676 m)   Wt 71.2 kg (157 lb)   SpO2 95%   BMI 25.34 kg/m²     "

## 2023-02-07 NOTE — PROVATION PATIENT INSTRUCTIONS
Discharge Summary/Instructions after an Endoscopic Procedure  Patient Name: Mark Rich  Patient MRN: [id number]  Patient YOB: 1990 Tuesday, February 7, 2023  Kyrie Lowe MD  Dear patient,  As a result of recent federal legislation (The Federal Cures Act), you may   receive lab or pathology results from your procedure in your [facility]   account before your physician is able to contact you. Your physician or   their representative will relay the results to you with their   recommendations at their soonest availability.  Thank you,  Your health is very important to us during the Covid Crisis. Following your   procedure today, you will receive a daily text for 2 weeks asking about   signs or symptoms of Covid 19.  Please respond to this text when you   receive it so we can follow up and keep you as safe as possible.   RESTRICTIONS:  During your procedure today, you received medications for sedation.  These   medications may affect your judgment, balance and coordination.  Therefore,   for 24 hours, you have the following restrictions:   - DO NOT drive a car, operate machinery, make legal/financial decisions,   sign important papers or drink alcohol.    ACTIVITY:  Today: no heavy lifting, straining or running due to procedural   sedation/anesthesia.  The following day: return to full activity including work.  DIET:  Eat and drink normally unless instructed otherwise.     TREATMENT FOR COMMON SIDE EFFECTS:  - Mild abdominal pain, nausea, belching, bloating or excessive gas:  rest,   eat lightly and use a heating pad.  - Sore Throat: treat with throat lozenges and/or gargle with warm salt   water.  - Because air was used during the procedure, expelling large amounts of air   from your rectum or belching is normal.  - If a bowel prep was taken, you may not have a bowel movement for 1-3 days.    This is normal.  SYMPTOMS TO WATCH FOR AND REPORT TO YOUR PHYSICIAN:  1. Abdominal pain or bloating, other  than gas cramps.  2. Chest pain.  3. Back pain.  4. Signs of infection such as: chills or fever occurring within 24 hours   after the procedure.  5. Rectal bleeding, which would show as bright red, maroon, or black stools.   (A tablespoon of blood from the rectum is not serious, especially if   hemorrhoids are present.)  6. Vomiting.  7. Weakness or dizziness.  GO DIRECTLY TO THE NEAREST EMERGENCY ROOM IF YOU HAVE ANY OF THE FOLLOWING:      Difficulty breathing              Chills and/or fever over 101 F   Persistent vomiting and/or vomiting blood   Severe abdominal pain   Severe chest pain   Black, tarry stools   Bleeding- more than one tablespoon   Any other symptom or condition that you feel may need urgent attention  Your doctor recommends these additional instructions:  If any biopsies were taken, your doctors clinic will contact you in 1 to 2   weeks with any results.  - Discharge patient to home.   - Patient has a contact number available for emergencies.  The signs and   symptoms of potential delayed complications were discussed with the   patient.  Return to normal activities tomorrow.  Written discharge   instructions were provided to the patient.   - Resume previous diet.   - Continue present medications.   - Await pathology results.   - Repeat colonoscopy in 1 year because the bowel preparation was suboptimal.   (mother with CRC in 30s)  - Return to my office at appointment to be scheduled.  For questions, problems or results please call your physician - Kyrie Lowe MD.  EMERGENCY PHONE NUMBER: [phone],  LAB RESULTS: [phone]  IF A COMPLICATION OR EMERGENCY SITUATION ARISES AND YOU ARE UNABLE TO REACH   YOUR PHYSICIAN - GO DIRECTLY TO THE EMERGENCY ROOM.  Kyrie Lowe MD  2/7/2023 3:09:30 PM  This report has been verified and signed electronically.  Dear patient,  As a result of recent federal legislation (The Federal Cures Act), you may   receive lab or pathology results from  your procedure in your [facility]   account before your physician is able to contact you. Your physician or   their representative will relay the results to you with their   recommendations at their soonest availability.  Thank you,  PROVATION

## 2023-02-08 ENCOUNTER — TELEPHONE (OUTPATIENT)
Dept: ADMINISTRATIVE | Facility: OTHER | Age: 33
End: 2023-02-08
Payer: MEDICARE

## 2023-02-08 VITALS
BODY MASS INDEX: 25.23 KG/M2 | RESPIRATION RATE: 18 BRPM | OXYGEN SATURATION: 100 % | HEIGHT: 66 IN | TEMPERATURE: 98 F | WEIGHT: 157 LBS | DIASTOLIC BLOOD PRESSURE: 74 MMHG | SYSTOLIC BLOOD PRESSURE: 122 MMHG | HEART RATE: 87 BPM

## 2023-02-09 NOTE — ANESTHESIA POSTPROCEDURE EVALUATION
Anesthesia Post Evaluation    Patient: Mark Rich    Procedure(s) Performed: Procedure(s) (LRB):  COLONOSCOPY (N/A)    Final Anesthesia Type: general      Patient location during evaluation: PACU  Patient participation: Yes- Able to Participate  Level of consciousness: awake and alert and oriented  Post-procedure vital signs: reviewed and stable  Pain management: adequate  Airway patency: patent    PONV status at discharge: No PONV  Anesthetic complications: no      Cardiovascular status: blood pressure returned to baseline and hemodynamically stable  Respiratory status: unassisted, room air and spontaneous ventilation  Hydration status: euvolemic  Follow-up not needed.          Vitals Value Taken Time   /74 02/07/23 1539   Temp 36.7 °C (98 °F) 02/07/23 1517   Pulse 87 02/07/23 1539   Resp 18 02/07/23 1539   SpO2 100 % 02/07/23 1539         No case tracking events are documented in the log.      Pain/Jose Daniel Score: No data recorded

## 2023-02-10 LAB
FINAL PATHOLOGIC DIAGNOSIS: NORMAL
GROSS: NORMAL
Lab: NORMAL

## 2023-03-07 ENCOUNTER — OFFICE VISIT (OUTPATIENT)
Dept: SURGERY | Facility: CLINIC | Age: 33
End: 2023-03-07
Payer: MEDICARE

## 2023-03-07 DIAGNOSIS — K64.9 HEMORRHOIDS, UNSPECIFIED HEMORRHOID TYPE: ICD-10-CM

## 2023-03-07 DIAGNOSIS — D64.9 ANEMIA, UNSPECIFIED TYPE: ICD-10-CM

## 2023-03-07 DIAGNOSIS — K64.9 BLEEDING HEMORRHOID: ICD-10-CM

## 2023-03-07 DIAGNOSIS — K60.2 ANAL FISSURE AND FISTULA: Primary | ICD-10-CM

## 2023-03-07 DIAGNOSIS — K60.3 ANAL FISSURE AND FISTULA: Primary | ICD-10-CM

## 2023-03-07 PROCEDURE — 99999 PR PBB SHADOW E&M-EST. PATIENT-LVL III: CPT | Mod: PBBFAC,,, | Performed by: COLON & RECTAL SURGERY

## 2023-03-07 PROCEDURE — 1160F PR REVIEW ALL MEDS BY PRESCRIBER/CLIN PHARMACIST DOCUMENTED: ICD-10-PCS | Mod: CPTII,S$GLB,, | Performed by: COLON & RECTAL SURGERY

## 2023-03-07 PROCEDURE — 1159F PR MEDICATION LIST DOCUMENTED IN MEDICAL RECORD: ICD-10-PCS | Mod: CPTII,S$GLB,, | Performed by: COLON & RECTAL SURGERY

## 2023-03-07 PROCEDURE — 99999 PR PBB SHADOW E&M-EST. PATIENT-LVL III: ICD-10-PCS | Mod: PBBFAC,,, | Performed by: COLON & RECTAL SURGERY

## 2023-03-07 PROCEDURE — 1159F MED LIST DOCD IN RCRD: CPT | Mod: CPTII,S$GLB,, | Performed by: COLON & RECTAL SURGERY

## 2023-03-07 PROCEDURE — 99203 PR OFFICE/OUTPT VISIT, NEW, LEVL III, 30-44 MIN: ICD-10-PCS | Mod: S$GLB,,, | Performed by: COLON & RECTAL SURGERY

## 2023-03-07 PROCEDURE — 99203 OFFICE O/P NEW LOW 30 MIN: CPT | Mod: S$GLB,,, | Performed by: COLON & RECTAL SURGERY

## 2023-03-07 PROCEDURE — 1160F RVW MEDS BY RX/DR IN RCRD: CPT | Mod: CPTII,S$GLB,, | Performed by: COLON & RECTAL SURGERY

## 2023-03-07 NOTE — PROGRESS NOTES
CRS Office Visit History and Physical    Referring Md:   Aaareferral Self  No address on file    SUBJECTIVE:     Chief Complaint:  Bleeding hemorrhoids    History of Present Illness:  The patient is a new patient to this practice.   Course is as follows:  Patient is a 33 y.o. male presents with bleeding hemorrhoids  Under history of perianal pain and bleeding with defecation.  He has 2-3 bowel movements per day.  No past anorectal surgeries.  Past abdominal surgery of a transverse laparotomy for obstructions a child.  Notes external perianal pimple and small volume external hemorrhoidal disease.  Main complaint is bleeding and pain.  Family history significant for colon cancer in his mother.  Regarding the bleeding, he has significant perianal bleeding.  History of transfusion x1 for anemia.    Last Colonoscopy:  02/07/2023:  Impression:            - Preparation of the colon was poor.                          - Hemorrhoids found on perianal exam.                          - Internal hemorrhoids.                          - The examined portion of the ileum was normal.                          - Patent end-to-end ileo-colonic anastomosis,                          characterized by ulceration. Biopsied.  --> - Benign colonic and small intestinal mucosa with patchy active inflammation                          - Diverticulosis in the descending colon and in                          the transverse colon.                          - One 5 mm polyp in the rectum, removed with a                          cold snare. Resected and retrieved.  --> consistent with hyperplastic polyp                         - The examination was otherwise normal.                          Severe rectal bleeding and anemia, chronic. I                          belive the culprit is large int hemorrhoids, but                          he has ulceration only at the ileo-colonic                          anastomosis, which biopsied. No other signs of                           inflammation in the stephanie-TI or rest of colon, thus                          less likely IBD or other chronic condition.     Review of patient's allergies indicates:   Allergen Reactions    Sulfa (sulfonamide antibiotics) Other (See Comments)      Pt not sure of reaction       Past Medical History:   Diagnosis Date    ADHD (attention deficit hyperactivity disorder)     Anxiety     Bipolar affective disorder     Bowel obstruction     Unspecified hemorrhoids      Past Surgical History:   Procedure Laterality Date    ADENOIDECTOMY      COLONOSCOPY N/A 2/7/2023    Procedure: COLONOSCOPY;  Surgeon: Kyrie Lowe MD;  Location: [facility];  Service: Endoscopy;  Laterality: N/A;    TONSILLECTOMY       Family History   Problem Relation Age of Onset    Colon cancer Mother     Pancreatic cancer Father      Social History     Tobacco Use    Smoking status: Some Days    Smokeless tobacco: Never   Substance Use Topics    Alcohol use: Not Currently     Comment: social     Drug use: Never        Review of Systems:  Review of Systems   Constitutional:  Positive for malaise/fatigue. Negative for chills, diaphoresis, fever and weight loss.   HENT:  Negative for congestion.    Respiratory:  Negative for shortness of breath.    Cardiovascular:  Negative for chest pain and leg swelling.   Gastrointestinal:  Positive for abdominal pain, blood in stool and constipation. Negative for nausea and vomiting.   Genitourinary:  Negative for dysuria.   Musculoskeletal:  Negative for back pain and myalgias.   Skin:  Negative for rash.   Neurological:  Negative for dizziness and weakness.   Endo/Heme/Allergies:  Does not bruise/bleed easily.   Psychiatric/Behavioral:  Negative for depression.      OBJECTIVE:     Vital Signs (Most Recent)  There were no vitals taken for this visit.    Physical Exam:  General: White male in no distress   Neuro: alert and oriented x 4.  Moves all extremities.     HEENT: no icterus.  Trachea  midline  Respiratory: respirations are even and unlabored  Cardiac: regular rate  Abdomen:  Lower transverse laparotomy well healed without hernia.  Small periumbilical hernia.  Extremities: Warm dry and intact  Skin: no rashes  Anorectal:  External exam with posterior midline external opening.  Underlying tract palpable tracking to the posterior midline.  Small volume external hemorrhoidal disease.  Digital exam with hypertonic anal sphincter with defect in the posterior midline.  Soft internal hemorrhoids palpated.  Unable to tolerate anoscopy.    Labs: H&H 6.6 and 24    Imaging:  None      ASSESSMENT/PLAN:     Diagnoses and all orders for this visit:    Anal fissure and fistula  -     Case Request Operating Room: HEMORRHOIDECTOMY, FISTULOTOMY    Anemia, unspecified type  -     Ambulatory referral/consult to Gastroenterology  -     Case Request Operating Room: HEMORRHOIDECTOMY, FISTULOTOMY    Hemorrhoids, unspecified hemorrhoid type  -     Ambulatory referral/consult to Gastroenterology    Bleeding hemorrhoid  -     Case Request Operating Room: HEMORRHOIDECTOMY, FISTULOTOMY        33 y.o. male with rectal bleeding with anemia secondary to likely internal hemorrhoids.  Additionally, he has a posterior midline anal fissure with associated fistula resulting in ongoing pain with defecation.  Recommended further evaluation with exam under anesthesia, limited fistulotomy, possible sphincterotomy, and excision of internal hemorrhoids.  Consents were signed today and all questions were answered.    LEEANNE French MD, FACS, FASCRS  Staff Surgeon  Colon & Rectal Surgery

## 2023-03-07 NOTE — H&P (VIEW-ONLY)
CRS Office Visit History and Physical    Referring Md:   Aaareferral Self  No address on file    SUBJECTIVE:     Chief Complaint:  Bleeding hemorrhoids    History of Present Illness:  The patient is a new patient to this practice.   Course is as follows:  Patient is a 33 y.o. male presents with bleeding hemorrhoids  Under history of perianal pain and bleeding with defecation.  He has 2-3 bowel movements per day.  No past anorectal surgeries.  Past abdominal surgery of a transverse laparotomy for obstructions a child.  Notes external perianal pimple and small volume external hemorrhoidal disease.  Main complaint is bleeding and pain.  Family history significant for colon cancer in his mother.  Regarding the bleeding, he has significant perianal bleeding.  History of transfusion x1 for anemia.    Last Colonoscopy:  02/07/2023:  Impression:            - Preparation of the colon was poor.                          - Hemorrhoids found on perianal exam.                          - Internal hemorrhoids.                          - The examined portion of the ileum was normal.                          - Patent end-to-end ileo-colonic anastomosis,                          characterized by ulceration. Biopsied.  --> - Benign colonic and small intestinal mucosa with patchy active inflammation                          - Diverticulosis in the descending colon and in                          the transverse colon.                          - One 5 mm polyp in the rectum, removed with a                          cold snare. Resected and retrieved.  --> consistent with hyperplastic polyp                         - The examination was otherwise normal.                          Severe rectal bleeding and anemia, chronic. I                          belive the culprit is large int hemorrhoids, but                          he has ulceration only at the ileo-colonic                          anastomosis, which biopsied. No other signs of                           inflammation in the stephanie-TI or rest of colon, thus                          less likely IBD or other chronic condition.     Review of patient's allergies indicates:   Allergen Reactions    Sulfa (sulfonamide antibiotics) Other (See Comments)      Pt not sure of reaction       Past Medical History:   Diagnosis Date    ADHD (attention deficit hyperactivity disorder)     Anxiety     Bipolar affective disorder     Bowel obstruction     Unspecified hemorrhoids      Past Surgical History:   Procedure Laterality Date    ADENOIDECTOMY      COLONOSCOPY N/A 2/7/2023    Procedure: COLONOSCOPY;  Surgeon: Kyrie Lowe MD;  Location: [facility];  Service: Endoscopy;  Laterality: N/A;    TONSILLECTOMY       Family History   Problem Relation Age of Onset    Colon cancer Mother     Pancreatic cancer Father      Social History     Tobacco Use    Smoking status: Some Days    Smokeless tobacco: Never   Substance Use Topics    Alcohol use: Not Currently     Comment: social     Drug use: Never        Review of Systems:  Review of Systems   Constitutional:  Positive for malaise/fatigue. Negative for chills, diaphoresis, fever and weight loss.   HENT:  Negative for congestion.    Respiratory:  Negative for shortness of breath.    Cardiovascular:  Negative for chest pain and leg swelling.   Gastrointestinal:  Positive for abdominal pain, blood in stool and constipation. Negative for nausea and vomiting.   Genitourinary:  Negative for dysuria.   Musculoskeletal:  Negative for back pain and myalgias.   Skin:  Negative for rash.   Neurological:  Negative for dizziness and weakness.   Endo/Heme/Allergies:  Does not bruise/bleed easily.   Psychiatric/Behavioral:  Negative for depression.      OBJECTIVE:     Vital Signs (Most Recent)  There were no vitals taken for this visit.    Physical Exam:  General: White male in no distress   Neuro: alert and oriented x 4.  Moves all extremities.     HEENT: no icterus.  Trachea  midline  Respiratory: respirations are even and unlabored  Cardiac: regular rate  Abdomen:  Lower transverse laparotomy well healed without hernia.  Small periumbilical hernia.  Extremities: Warm dry and intact  Skin: no rashes  Anorectal:  External exam with posterior midline external opening.  Underlying tract palpable tracking to the posterior midline.  Small volume external hemorrhoidal disease.  Digital exam with hypertonic anal sphincter with defect in the posterior midline.  Soft internal hemorrhoids palpated.  Unable to tolerate anoscopy.    Labs: H&H 6.6 and 24    Imaging:  None      ASSESSMENT/PLAN:     Diagnoses and all orders for this visit:    Anal fissure and fistula  -     Case Request Operating Room: HEMORRHOIDECTOMY, FISTULOTOMY    Anemia, unspecified type  -     Ambulatory referral/consult to Gastroenterology  -     Case Request Operating Room: HEMORRHOIDECTOMY, FISTULOTOMY    Hemorrhoids, unspecified hemorrhoid type  -     Ambulatory referral/consult to Gastroenterology    Bleeding hemorrhoid  -     Case Request Operating Room: HEMORRHOIDECTOMY, FISTULOTOMY        33 y.o. male with rectal bleeding with anemia secondary to likely internal hemorrhoids.  Additionally, he has a posterior midline anal fissure with associated fistula resulting in ongoing pain with defecation.  Recommended further evaluation with exam under anesthesia, limited fistulotomy, possible sphincterotomy, and excision of internal hemorrhoids.  Consents were signed today and all questions were answered.    LEEANNE French MD, FACS, FASCRS  Staff Surgeon  Colon & Rectal Surgery

## 2023-03-15 ENCOUNTER — TELEPHONE (OUTPATIENT)
Dept: SURGERY | Facility: CLINIC | Age: 33
End: 2023-03-15
Payer: MEDICARE

## 2023-03-17 ENCOUNTER — TELEPHONE (OUTPATIENT)
Dept: SURGERY | Facility: CLINIC | Age: 33
End: 2023-03-17
Payer: MEDICARE

## 2023-03-17 NOTE — TELEPHONE ENCOUNTER
----- Message from Adriana Flor sent at 3/17/2023 12:50 PM CDT -----  Contact: Anu @[phone]  Caller mom is calling in stating that the pt has been losing a lot of blood and would like to know if he should go get a blood transfusion before the pt procedure. Please call to discuss further.

## 2023-03-17 NOTE — TELEPHONE ENCOUNTER
Returned patient's phone call.  He is having increasing fatigue.  I called his phone but did not answer.  I then spoke to his mother.  Recommended that if he is having significant fatigue, to go to the ER for evaluation and to check his hemoglobin.  He may benefit from transfusion.    LEEANNE French MD, FACS, FASCRS  Staff Surgeon  Colon & Rectal Surgery        ----- Message from Jessenia Balderas RN sent at 3/17/2023  1:41 PM CDT -----  Hi-    I spoke with his mother - she states that he is losing a lot of blood and wants to know if he needs to be transfused prior to surgery?    Do you want a CBC? His last one was in January.

## 2023-03-17 NOTE — TELEPHONE ENCOUNTER
Spoke with mother regarding arrival time for surgery and possible lab work to check blood count prior to surgery.Explained that I will call her back with plan of care. Mother verbalizes understanding.

## 2023-03-20 ENCOUNTER — ANESTHESIA EVENT (OUTPATIENT)
Dept: SURGERY | Facility: HOSPITAL | Age: 33
End: 2023-03-20
Payer: MEDICARE

## 2023-03-20 ENCOUNTER — ANESTHESIA (OUTPATIENT)
Dept: SURGERY | Facility: HOSPITAL | Age: 33
End: 2023-03-20
Payer: MEDICARE

## 2023-03-20 ENCOUNTER — RESEARCH ENCOUNTER (OUTPATIENT)
Dept: RESEARCH | Facility: HOSPITAL | Age: 33
End: 2023-03-20
Payer: MEDICARE

## 2023-03-20 ENCOUNTER — HOSPITAL ENCOUNTER (OUTPATIENT)
Facility: HOSPITAL | Age: 33
Discharge: HOME | End: 2023-03-20
Attending: COLON & RECTAL SURGERY | Admitting: COLON & RECTAL SURGERY
Payer: MEDICARE

## 2023-03-20 VITALS
SYSTOLIC BLOOD PRESSURE: 140 MMHG | DIASTOLIC BLOOD PRESSURE: 81 MMHG | BODY MASS INDEX: 25.02 KG/M2 | RESPIRATION RATE: 16 BRPM | WEIGHT: 155 LBS | OXYGEN SATURATION: 98 % | HEART RATE: 95 BPM | TEMPERATURE: 98 F

## 2023-03-20 DIAGNOSIS — K64.9 HEMORRHOIDS: ICD-10-CM

## 2023-03-20 PROCEDURE — D9220A PRA ANESTHESIA: Mod: CRNA,,, | Performed by: NURSE ANESTHETIST, CERTIFIED REGISTERED

## 2023-03-20 PROCEDURE — 36000706: Performed by: COLON & RECTAL SURGERY

## 2023-03-20 PROCEDURE — D9220A PRA ANESTHESIA: Mod: ANES,,, | Performed by: ANESTHESIOLOGY

## 2023-03-20 PROCEDURE — 71000045 HC DOSC ROUTINE RECOVERY EA ADD'L HR: Performed by: COLON & RECTAL SURGERY

## 2023-03-20 PROCEDURE — 88304 TISSUE EXAM BY PATHOLOGIST: CPT | Mod: 26,,, | Performed by: PATHOLOGY

## 2023-03-20 PROCEDURE — 63600175 PHARM REV CODE 636 W HCPCS: Mod: TB,JG | Performed by: NURSE ANESTHETIST, CERTIFIED REGISTERED

## 2023-03-20 PROCEDURE — 71000044 HC DOSC ROUTINE RECOVERY FIRST HOUR: Performed by: COLON & RECTAL SURGERY

## 2023-03-20 PROCEDURE — 36000707: Performed by: COLON & RECTAL SURGERY

## 2023-03-20 PROCEDURE — 88304 TISSUE EXAM BY PATHOLOGIST: CPT | Performed by: PATHOLOGY

## 2023-03-20 PROCEDURE — D9220A PRA ANESTHESIA: ICD-10-PCS | Mod: CRNA,,, | Performed by: NURSE ANESTHETIST, CERTIFIED REGISTERED

## 2023-03-20 PROCEDURE — 25000003 PHARM REV CODE 250: Performed by: NURSE ANESTHETIST, CERTIFIED REGISTERED

## 2023-03-20 PROCEDURE — 37000009 HC ANESTHESIA EA ADD 15 MINS: Performed by: COLON & RECTAL SURGERY

## 2023-03-20 PROCEDURE — 25000003 PHARM REV CODE 250

## 2023-03-20 PROCEDURE — D9220A PRA ANESTHESIA: ICD-10-PCS | Mod: ANES,,, | Performed by: ANESTHESIOLOGY

## 2023-03-20 PROCEDURE — 27201423 OPTIME MED/SURG SUP & DEVICES STERILE SUPPLY: Performed by: COLON & RECTAL SURGERY

## 2023-03-20 PROCEDURE — 63600175 PHARM REV CODE 636 W HCPCS: Performed by: NURSE ANESTHETIST, CERTIFIED REGISTERED

## 2023-03-20 PROCEDURE — 88304 PR  SURG PATH,LEVEL III: ICD-10-PCS | Mod: 26,,, | Performed by: PATHOLOGY

## 2023-03-20 PROCEDURE — 63600175 PHARM REV CODE 636 W HCPCS: Performed by: ANESTHESIOLOGY

## 2023-03-20 PROCEDURE — 46261 REMOVE IN/EX HEM GRPS & FISS: CPT | Mod: ,,, | Performed by: COLON & RECTAL SURGERY

## 2023-03-20 PROCEDURE — 25000003 PHARM REV CODE 250: Performed by: COLON & RECTAL SURGERY

## 2023-03-20 PROCEDURE — 37000008 HC ANESTHESIA 1ST 15 MINUTES: Performed by: COLON & RECTAL SURGERY

## 2023-03-20 PROCEDURE — 71000015 HC POSTOP RECOV 1ST HR: Performed by: COLON & RECTAL SURGERY

## 2023-03-20 PROCEDURE — 25000003 PHARM REV CODE 250: Performed by: NURSE PRACTITIONER

## 2023-03-20 PROCEDURE — 46261: ICD-10-PCS | Mod: ,,, | Performed by: COLON & RECTAL SURGERY

## 2023-03-20 RX ORDER — SODIUM CHLORIDE 9 MG/ML
INJECTION, SOLUTION INTRAVENOUS CONTINUOUS
Status: ACTIVE | OUTPATIENT
Start: 2023-03-20

## 2023-03-20 RX ORDER — HYDROMORPHONE HYDROCHLORIDE 1 MG/ML
INJECTION, SOLUTION INTRAMUSCULAR; INTRAVENOUS; SUBCUTANEOUS
Status: DISCONTINUED | OUTPATIENT
Start: 2023-03-20 | End: 2023-03-20

## 2023-03-20 RX ORDER — FENTANYL CITRATE 50 UG/ML
INJECTION, SOLUTION INTRAMUSCULAR; INTRAVENOUS
Status: DISCONTINUED | OUTPATIENT
Start: 2023-03-20 | End: 2023-03-20

## 2023-03-20 RX ORDER — POLYETHYLENE GLYCOL 3350 17 G/17G
17 POWDER, FOR SOLUTION ORAL DAILY PRN
Qty: 510 G | Refills: 0 | Status: SHIPPED | OUTPATIENT
Start: 2023-03-20

## 2023-03-20 RX ORDER — MUPIROCIN 20 MG/G
OINTMENT TOPICAL
Status: DISPENSED | OUTPATIENT
Start: 2023-03-20

## 2023-03-20 RX ORDER — OXYCODONE HYDROCHLORIDE 5 MG/1
5 TABLET ORAL ONCE AS NEEDED
Status: COMPLETED | OUTPATIENT
Start: 2023-03-20 | End: 2023-03-20

## 2023-03-20 RX ORDER — MIDAZOLAM HYDROCHLORIDE 1 MG/ML
INJECTION, SOLUTION INTRAMUSCULAR; INTRAVENOUS
Status: DISCONTINUED | OUTPATIENT
Start: 2023-03-20 | End: 2023-03-20

## 2023-03-20 RX ORDER — LIDOCAINE HYDROCHLORIDE AND EPINEPHRINE 10; 10 MG/ML; UG/ML
INJECTION, SOLUTION INFILTRATION; PERINEURAL
Status: DISCONTINUED | OUTPATIENT
Start: 2023-03-20 | End: 2023-03-20 | Stop reason: HOSPADM

## 2023-03-20 RX ORDER — ACETAMINOPHEN 10 MG/ML
INJECTION, SOLUTION INTRAVENOUS
Status: DISCONTINUED | OUTPATIENT
Start: 2023-03-20 | End: 2023-03-20

## 2023-03-20 RX ORDER — LIDOCAINE HYDROCHLORIDE 20 MG/ML
INJECTION, SOLUTION EPIDURAL; INFILTRATION; INTRACAUDAL; PERINEURAL
Status: DISCONTINUED | OUTPATIENT
Start: 2023-03-20 | End: 2023-03-20

## 2023-03-20 RX ORDER — BUPIVACAINE HYDROCHLORIDE 2.5 MG/ML
INJECTION, SOLUTION EPIDURAL; INFILTRATION; INTRACAUDAL
Status: DISCONTINUED | OUTPATIENT
Start: 2023-03-20 | End: 2023-03-20 | Stop reason: HOSPADM

## 2023-03-20 RX ORDER — ROCURONIUM BROMIDE 10 MG/ML
INJECTION, SOLUTION INTRAVENOUS
Status: DISCONTINUED | OUTPATIENT
Start: 2023-03-20 | End: 2023-03-20

## 2023-03-20 RX ORDER — SODIUM CHLORIDE 0.9 % (FLUSH) 0.9 %
3 SYRINGE (ML) INJECTION
Status: DISCONTINUED | OUTPATIENT
Start: 2023-03-20 | End: 2023-03-20 | Stop reason: HOSPADM

## 2023-03-20 RX ORDER — ONDANSETRON 2 MG/ML
INJECTION INTRAMUSCULAR; INTRAVENOUS
Status: DISCONTINUED | OUTPATIENT
Start: 2023-03-20 | End: 2023-03-20

## 2023-03-20 RX ORDER — DEXAMETHASONE SODIUM PHOSPHATE 4 MG/ML
INJECTION, SOLUTION INTRA-ARTICULAR; INTRALESIONAL; INTRAMUSCULAR; INTRAVENOUS; SOFT TISSUE
Status: DISCONTINUED | OUTPATIENT
Start: 2023-03-20 | End: 2023-03-20

## 2023-03-20 RX ORDER — OXYCODONE HYDROCHLORIDE 5 MG/1
5 TABLET ORAL EVERY 4 HOURS PRN
Qty: 30 TABLET | Refills: 0 | Status: SHIPPED | OUTPATIENT
Start: 2023-03-20 | End: 2023-04-03 | Stop reason: SDUPTHER

## 2023-03-20 RX ORDER — GABAPENTIN 100 MG/1
200 CAPSULE ORAL 3 TIMES DAILY
Qty: 84 CAPSULE | Refills: 0 | Status: SHIPPED | OUTPATIENT
Start: 2023-03-20 | End: 2023-04-03 | Stop reason: SDUPTHER

## 2023-03-20 RX ORDER — LIDOCAINE HYDROCHLORIDE 10 MG/ML
INJECTION, SOLUTION EPIDURAL; INFILTRATION; INTRACAUDAL; PERINEURAL
Status: COMPLETED
Start: 2023-03-20 | End: 2023-03-20

## 2023-03-20 RX ORDER — DEXMEDETOMIDINE HYDROCHLORIDE 100 UG/ML
INJECTION, SOLUTION INTRAVENOUS
Status: DISCONTINUED | OUTPATIENT
Start: 2023-03-20 | End: 2023-03-20

## 2023-03-20 RX ORDER — PROPOFOL 10 MG/ML
VIAL (ML) INTRAVENOUS
Status: DISCONTINUED | OUTPATIENT
Start: 2023-03-20 | End: 2023-03-20

## 2023-03-20 RX ORDER — FENTANYL CITRATE 50 UG/ML
25 INJECTION, SOLUTION INTRAMUSCULAR; INTRAVENOUS EVERY 5 MIN PRN
Status: COMPLETED | OUTPATIENT
Start: 2023-03-20 | End: 2023-03-20

## 2023-03-20 RX ORDER — HALOPERIDOL 5 MG/ML
0.5 INJECTION INTRAMUSCULAR EVERY 10 MIN PRN
Status: DISCONTINUED | OUTPATIENT
Start: 2023-03-20 | End: 2023-03-20 | Stop reason: HOSPADM

## 2023-03-20 RX ORDER — ACETAMINOPHEN 500 MG
1000 TABLET ORAL EVERY 8 HOURS PRN
Qty: 40 TABLET | Refills: 0 | Status: SHIPPED | OUTPATIENT
Start: 2023-03-20

## 2023-03-20 RX ORDER — LIDOCAINE HYDROCHLORIDE 10 MG/ML
1 INJECTION, SOLUTION EPIDURAL; INFILTRATION; INTRACAUDAL; PERINEURAL ONCE
Status: COMPLETED | OUTPATIENT
Start: 2023-03-20 | End: 2023-03-20

## 2023-03-20 RX ORDER — IBUPROFEN 800 MG/1
800 TABLET ORAL 3 TIMES DAILY
Qty: 21 TABLET | Refills: 0 | Status: SHIPPED | OUTPATIENT
Start: 2023-03-20 | End: 2023-03-27

## 2023-03-20 RX ADMIN — FENTANYL CITRATE 50 MCG: 50 INJECTION INTRAMUSCULAR; INTRAVENOUS at 10:03

## 2023-03-20 RX ADMIN — SODIUM CHLORIDE: 9 INJECTION, SOLUTION INTRAVENOUS at 09:03

## 2023-03-20 RX ADMIN — FENTANYL CITRATE 25 MCG: 50 INJECTION, SOLUTION INTRAMUSCULAR; INTRAVENOUS at 12:03

## 2023-03-20 RX ADMIN — OXYCODONE HYDROCHLORIDE 5 MG: 5 TABLET ORAL at 12:03

## 2023-03-20 RX ADMIN — LIDOCAINE HYDROCHLORIDE 10 MG: 10 INJECTION, SOLUTION EPIDURAL; INFILTRATION; INTRACAUDAL; PERINEURAL at 09:03

## 2023-03-20 RX ADMIN — MUPIROCIN: 20 OINTMENT TOPICAL at 09:03

## 2023-03-20 RX ADMIN — PROPOFOL 160 MG: 10 INJECTION, EMULSION INTRAVENOUS at 10:03

## 2023-03-20 RX ADMIN — FENTANYL CITRATE 100 MCG: 50 INJECTION INTRAMUSCULAR; INTRAVENOUS at 10:03

## 2023-03-20 RX ADMIN — MIDAZOLAM 2 MG: 1 INJECTION INTRAMUSCULAR; INTRAVENOUS at 10:03

## 2023-03-20 RX ADMIN — ACETAMINOPHEN 1000 MG: 10 INJECTION INTRAVENOUS at 11:03

## 2023-03-20 RX ADMIN — DEXAMETHASONE SODIUM PHOSPHATE 4 MG: 4 INJECTION INTRA-ARTICULAR; INTRALESIONAL; INTRAMUSCULAR; INTRAVENOUS; SOFT TISSUE at 10:03

## 2023-03-20 RX ADMIN — DEXMEDETOMIDINE 8 MCG: 100 INJECTION, SOLUTION INTRAVENOUS at 11:03

## 2023-03-20 RX ADMIN — ROCURONIUM BROMIDE 40 MG: 10 INJECTION, SOLUTION INTRAVENOUS at 10:03

## 2023-03-20 RX ADMIN — LIDOCAINE HYDROCHLORIDE 80 MG: 20 INJECTION, SOLUTION EPIDURAL; INFILTRATION; INTRACAUDAL at 10:03

## 2023-03-20 RX ADMIN — SODIUM CHLORIDE: 9 INJECTION, SOLUTION INTRAVENOUS at 10:03

## 2023-03-20 RX ADMIN — ONDANSETRON 4 MG: 2 INJECTION INTRAMUSCULAR; INTRAVENOUS at 10:03

## 2023-03-20 RX ADMIN — HYDROMORPHONE HYDROCHLORIDE 0.5 MG: 1 INJECTION, SOLUTION INTRAMUSCULAR; INTRAVENOUS; SUBCUTANEOUS at 11:03

## 2023-03-20 RX ADMIN — SUGAMMADEX 200 MG: 100 INJECTION, SOLUTION INTRAVENOUS at 11:03

## 2023-03-20 NOTE — ANESTHESIA POSTPROCEDURE EVALUATION
Anesthesia Post Evaluation    Patient: Mark Rich    Procedure(s) Performed: Procedure(s) (LRB):  HEMORRHOIDECTOMY x2 (N/A)  FISTULOTOMY (N/A)  SPHINCTEROTOMY, ANAL    Final Anesthesia Type: general      Patient location during evaluation: PACU  Patient participation: Yes- Able to Participate  Level of consciousness: awake and alert and oriented  Post-procedure vital signs: reviewed and stable  Pain management: adequate  Airway patency: patent    PONV status at discharge: No PONV  Anesthetic complications: no      Cardiovascular status: blood pressure returned to baseline  Respiratory status: unassisted, room air and spontaneous ventilation  Hydration status: euvolemic  Follow-up not needed.          Vitals Value Taken Time   /81 03/20/23 1316   Temp 36.7 °C (98 °F) 03/20/23 1145   Pulse 79 03/20/23 1322   Resp 10 03/20/23 1322   SpO2 100 % 03/20/23 1322   Vitals shown include unvalidated device data.      No case tracking events are documented in the log.      Pain/Jose Daniel Score: Pain Rating Prior to Med Admin: 8 (3/20/2023 12:42 PM)  Jose Daniel Score: 10 (3/20/2023 12:30 PM)

## 2023-03-20 NOTE — INTERVAL H&P NOTE
Pre-Procedural Update    H&P reviewed, patient examined, condition is updated. Patient evaluated preoperatively with no change in H&P - patient continues experiencing hematochezia similar to prior. Will proceed to OR for fistulotomy, possible excisional hemorrhoidectomy, possible sphincterotomy. Patient is consented, no antibiotics indicated.    Sulaiman Zhu MD  Colon and Rectal Surgery Fellow    There are no hospital problems to display for this patient.

## 2023-03-20 NOTE — BRIEF OP NOTE
[facility] - Surgery ([facility])  Brief Operative Note    Surgery Date: 3/20/2023     Surgeon(s) and Role:     * LEEANNE Caldwell MD - Primary     * Sulaiman Zhu MD - Fellow    Assisting Surgeon: None    Pre-op Diagnosis:  Anemia, unspecified type [D64.9]  Bleeding hemorrhoid [K64.9]  Anal fissure and fistula [K60.2, K60.3]    Post-op Diagnosis:  Post-Op Diagnosis Codes:     * Anemia, unspecified type [D64.9]     * Bleeding hemorrhoid [K64.9]     * Anal fissure and fistula [K60.2, K60.3]    Procedure(s) (LRB):  HEMORRHOIDECTOMY x2 (N/A)  FISTULOTOMY (N/A)  SPHINCTEROTOMY, ANAL    Anesthesia: General    Operative Findings: Per dictation    Estimated Blood Loss: 30cc         Specimens:   Specimen (24h ago, onward)       Start     Ordered    03/20/23 1124  Specimen to Pathology, Surgery Other  Once        Comments: Pre-op Diagnosis: Anemia, unspecified type [D64.9]Bleeding hemorrhoid [K64.9]Anal fissure and fistula [K60.2, K60.3]Procedure(s):HEMORRHOIDECTOMYFISTULOTOMY Number of specimens: 3Name of specimens: 1. Right posterior hemorrhoid- permanent2. Left lateral hemorrhoid- permanent3. Fissure and fistula- permanent     References:    Click here for ordering Quick Tip   Question Answer Comment   Procedure Type: Other    Specimen Class: Routine/Screening    Which provider would you like to cc? LEEANNE CALDWELL    Release to patient Immediate        03/20/23 1123                      Discharge Note    OUTCOME: Patient tolerated treatment/procedure well without complication and is now ready for discharge.    DISPOSITION: Home or Self Care    FINAL DIAGNOSIS: Anemia, unspecified type [D64.9]  Bleeding hemorrhoid [K64.9]  Anal fissure and fistula [K60.2, K60.3]    FOLLOWUP: In clinic    DISCHARGE INSTRUCTIONS:    Discharge Procedure Orders   Diet Adult Regular     Notify your health care provider if you experience any of the following:  increased confusion or weakness     Notify your health care provider if you  experience any of the following:  persistent dizziness, light-headedness, or visual disturbances     Notify your health care provider if you experience any of the following:  worsening rash     Notify your health care provider if you experience any of the following:  severe persistent headache     Notify your health care provider if you experience any of the following:  difficulty breathing or increased cough     Notify your health care provider if you experience any of the following:  redness, tenderness, or signs of infection (pain, swelling, redness, odor or green/yellow discharge around incision site)     Notify your health care provider if you experience any of the following:  severe uncontrolled pain     Notify your health care provider if you experience any of the following:  persistent nausea and vomiting or diarrhea     Notify your health care provider if you experience any of the following:  temperature >100.4     Activity as tolerated        Clinical Reference Documents Added to Patient Instructions         Document    HEMORRHOIDECTOMY DISCHARGE INSTRUCTIONS (ENGLISH)            Anal Surgery Post Op Instructions:    You had a fistulotomy, sphincterotomy and hemorrhoidectomy performed today.     Wound care:  Expect some drainage over the next few weeks as the wound heals.  Please wear a pad to help with drainage.     You have no activity restrictions.   No dietary restrictions.    Medications:  A narcotic pain medication has been prescribed.  Please start to wean the pain medication over the following few days.  You can take tylenol in addition to the pain medication.      Please take miralax 1 capful at night to prevent constipation associated with narcotic pain medications.      Follow up:  Return to clinic in 4 weeks for follow up and wound check.    LEEANNE French MD  Staff Surgeon  Colon & Rectal Surgery

## 2023-03-20 NOTE — PROGRESS NOTES
EFFECTS OF LOCAL ANESTHESIA ON POSTOPERATIVE PAIN CONTROL FOR MULTIPLE COLUMN HEMORRHOIDECTOMY: EXPAREL (LIPOSOMAL BUPIVACAINE) VS. STANDARD INFUSION  IRB #:  2021.170   - Shawna Burrell MD     SURGERY VISIT   018-SAS    Mr. Mark Rich has agreed to participate in the Exparel Hemorrhoid Study.  He was seen in pre-op and he did not have any new questions regarding his participation.  He is willing to continue his participation in the study.    He was randomized on 20 MAR 2023 at 09:52 am.      Anesthesia type was: General  It was a closed hemorrhoidectomy of 2 columns. Electrocautery was used for the dissection.  A ligasure was used.  He did not 30 mg Toradol intraop.  Please see the operative and anesthesia notes for any additional details that are needed.    Initial pain score in PACU was 10.  Pain score at discharge was 5.    He will receive study related communication tomorrow, the following day, and 30 days post-op.  If he has any questions regarding the study or the pain surveys, he can contact the study staff.    Study staff present at this visit was Sr. MICHAELA Lechuga.

## 2023-03-20 NOTE — ANESTHESIA PREPROCEDURE EVALUATION
03/20/2023  Mark Rich is a 33 y.o., male.      Pre-op Assessment    I have reviewed the Patient Summary Reports.     I have reviewed the Nursing Notes. I have reviewed the NPO Status.   I have reviewed the Medications.     Review of Systems  Anesthesia Hx:  No problems with previous Anesthesia  History of prior surgery of interest to airway management or planning: Denies Family Hx of Anesthesia complications.   Denies Personal Hx of Anesthesia complications.   Social:  Smoker    Hematology/Oncology:     Oncology Normal    -- Anemia:   EENT/Dental:EENT/Dental Normal   Cardiovascular:  Cardiovascular Normal Exercise tolerance: good  ECG has been reviewed.    Pulmonary:  Pulmonary Normal    Renal/:  Renal/ Normal     Hepatic/GI:  Hepatic/GI Normal    Musculoskeletal:  Musculoskeletal Normal    Neurological:  Neurology Normal    Endocrine:  Endocrine Normal    Dermatological:  Skin Normal    Psych:   Psychiatric History anxiety          Physical Exam  General: Well nourished, Cooperative, Alert and Oriented    Airway:  Mallampati: II   Mouth Opening: Normal  TM Distance: Normal  Tongue: Normal  Neck ROM: Normal ROM    Dental:  Intact        Anesthesia Plan  Type of Anesthesia, risks & benefits discussed:    Anesthesia Type: Gen ETT  Intra-op Monitoring Plan: Standard ASA Monitors  Post Op Pain Control Plan: multimodal analgesia and IV/PO Opioids PRN  Induction:  IV  Airway Plan: Direct, Post-Induction  Informed Consent: Informed consent signed with the Patient and all parties understand the risks and agree with anesthesia plan.  All questions answered.   ASA Score: 2  Day of Surgery Review of History & Physical: H&P Update referred to the surgeon/provider.    Ready For Surgery From Anesthesia Perspective.     .

## 2023-03-20 NOTE — PATIENT INSTRUCTIONS
Anal Surgery Post Op Instructions:    You had a fistulotomy, sphincterotomy and hemorrhoidectomy performed today.     Wound care:  Expect some drainage over the next few weeks as the wound heals.  Please wear a pad to help with drainage.     You have no activity restrictions.   No dietary restrictions.    Medications:  A narcotic pain medication has been prescribed.  Please start to wean the pain medication over the following few days.  You can take tylenol in addition to the pain medication.      Please take miralax 1 capful at night to prevent constipation associated with narcotic pain medications.      Follow up:  Return to clinic in 4 weeks for follow up and wound check.    LEEANNE French MD  Staff Surgeon  Colon & Rectal Surgery

## 2023-03-20 NOTE — PROGRESS NOTES
EFFECTS OF LOCAL ANESTHESIA ON POSTOPERATIVE PAIN CONTROL FOR MULTIPLE COLUMN HEMORRHOIDECTOMY: EXPAREL (LIPOSOMAL BUPIVACAINE) VS. STANDARD INFUSION  IRB #: [id number]   - Shawna Burrell MD     INFORMED CONSENT   018-SAS    Mr. Mark Rich was identified as a potential participant in the Exparel Hemorrhoid study.  Dr. French discussed study participation with him and he was willing to consider participation.  The study staff was brought in to review the consent document with him.  Family member was present and her participation was encouraged.     A full review of the consent document was provided. Opportunity for questions was available throughout the discussion.  All questions were answered to his satisfaction. After the review of the consent document he verbalized an understanding of the study and a willingness to participate. The consent document was then signed by him and Donald Mcgowan at 09:43am.  The consent contains information regarding the release of HIPAA protected information for the purposes of research. A copy of the signed document was provided to him along with the contact information for the study staff. He was encouraged to call our offices if he should have any questions regarding the study or his participation.     Baseline vitals were performed by clinic staff.  HR:  75 bpm  BP:  108 / 56  Baseline pain score:7    Eligibility was reviewed by Sr. MICHAELA Lechuga.  Inclusion Criteria:  1. Age 18 or older - yes  2. Planned for elective excisional hemorrhoidectomy involving 2 or more anal columns - yes  Exclusion Criteria:  1. Patients younger than the age of 18 - no  2. ASA 4 or higher - no  3. Use of opioids within 30 days of the planned procedure - no  4. History of substance abuse or psychiatric disorders - no  5. Patients allergic to LB, bupivacaine, or lidocaine - no  6. Patients unable to tolerate oxycodone or dilaudid as an oral pain medication - no  7.  Pregnant women - not applicable  8. Emergent cases - no  9. Reoperative hemorrhoidectomy - no    Next study visit will be on the day of surgery.    Study staff present at this visit was Sr. MICHAELA Lechuga.

## 2023-03-20 NOTE — OP NOTE
MARK MATAMOROS  [id number]  [id number]    OPERATIVE NOTE:    DATE OF OPERATION: 03/20/2023    PREOPERATIVE DIAGNOSIS:  Anal fissure.  Bleeding hemorrhoids.    POSTOPERATIVE DIAGNOSIS:  Anal fissure with associated fistula, bleeding hemorrhoids    PROCEDURE PERFORMED:   Two quadrant internal hemorrhoidectomy  Subcutaneous fistulotomy  Limited lateral internal anal sphincterotomy    ATTENDING SURGEON: LEEANNE French MD    RESIDENT/ FELLOW SURGEON: Sulaiman Zhu MD    ANESTHESIA:  General    ESTIMATED BLOOD LOSS:  50 mL    FINDINGS:  1. Hypertonic anal sphincter.  External opening for an anal fistula proximally 1 cm away from the anal verge in the posterior midline.  This was probed and tracked to a large fissure in the posterior midline.  Mostly subcutaneous in nature.  Fistulotomy was performed.  Edges of the fistula were divided in order to prevent early skin closure.  Hypertonic anal sphincter treated with limited lateral internal anal sphincterotomy.  Large left-sided internal hemorrhoid.  Large right-sided internal hemorrhoid.  Internal hemorrhoidectomy up of the right and left side performed with primary closure.    SPECIMENS:   Right lateral hemorrhoid  Left lateral hemorrhoid  Fissure fistula    COMPLICATIONS:  None apparent    DISPOSITION: PACU    CONDITION: good    INDICATION FOR PROCEDURE:  Mark Matamoros is a 33 y.o. male with significant anal pain with defecation as well as bleeding requiring transfusion.  He underwent workup demonstrating no other significant site of bleeding other significant internal hemorrhoids.  He therefore presented for exam under anesthesia with hemorrhoidectomy and all other indicated procedures.    DESCRIPTION OF PROCEDURE:   After informed consent was reviewed, the patient was taken to the operating room and placed under general anesthesia.  he was placed in the prone dilip-knife position.  The buttocks were taped apart and the perianal skin was prepped and draped in  usual sterile fashion.  A time-out was then performed.  External exam demonstrated hypertonic anal sphincter with 2 small pits in the posterior midline.  These were probed and tracked towards the posterior midline anal canal consistent with an anal fissure.  Detailed anoscopy was performed.  Large grade 2 right lateral internal hemorrhoid.  Smaller grade 1 left lateral hemorrhoid.  Large chronic appearing posterior midline anal fissure.  The external opening of fistula was cannulated and tracked to the posterior midline.  A fistula probe passed easily after hydrogen peroxide was injected.  Overlying skin was divided.  Base the fistula tract was inspected there were no additional tracts.  Edges were very heaped from chronicity.  The edges of the fistula tract were resected to allow improved healing.  The right lateral hemorrhoid was then addressed as it was the most significant internally.  Externally, there was minimal.  Proximal to the dentate line, there was large bulky redundant internal hemorrhoid.  An incision was made in the mucosa.  The plane between the hemorrhoidal plexus in the internal anal sphincter was then entered.  The overlying mucosa and hemorrhoidal plexus was divided using the hand-held ligature.  The apex was grasped with a tonsil clamp the overlying tissue was divided.  The apex was ligated with a 3-0 Vicryl tie.  The defect in the anal canal was closed with a running 3-0 Vicryl locked suture.  This was similarly performed on the left side.  Well over 2 cm between each suture line.  Easily accommodated a medium Hill-Arevalo.  Upon final inspection, there was a significant hypertonic anal sphincter.  Limited lateral internal anal sphincterotomy was performed on the right lateral side of the intersphincteric groove.  Eleven blade was inserted in the intersphincteric groove, the hypertonic fibers of the internal anal sphincter were divided.  The 11 blade was removed.  Hemostasis was achieved with  a 3-0 chromic suture as well as pressure.  20 mL of 0.25% Marcaine in 20% of 1% lidocaine was injected as a long acting local anesthetic.  The patient tolerated the procedure well.  There were no apparent complications.  Fluff dressings were applied.  he was then extubated and taken to PACU in stable condition.        LEEANNE French MD, FACS, FASCRS  Staff Surgeon  Colon & Rectal Surgery

## 2023-03-20 NOTE — ANESTHESIA PROCEDURE NOTES
Intubation    Date/Time: 3/20/2023 10:25 AM  Performed by: Dede Oleary CRNA  Authorized by: Anival Mann MD     Intubation:     Induction:  Intravenous    Intubated:  Postinduction    Mask Ventilation:  Easy mask    Attempts:  1    Attempted By:  CRNA    Method of Intubation:  Direct    Blade:  Nagel 2    Laryngeal View Grade: Grade I - full view of cords      Difficult Airway Encountered?: No      Complications:  None    Airway Device:  Oral endotracheal tube    Airway Device Size:  7.5    Style/Cuff Inflation:  Cuffed (inflated to minimal occlusive pressure)    Inflation Amount (mL):  8    Tube secured:  22    Secured at:  The lips    Placement Verified By:  Capnometry    Complicating Factors:  None    Findings Post-Intubation:  BS equal bilateral and atraumatic/condition of teeth unchanged

## 2023-03-20 NOTE — PLAN OF CARE
Chart reviewed. Preop nursing care completed per orders. Safe surgery checklist complete. Mom at bedside and to take belongings. Waiting for H&P updated and anesthesia consent prior to surgery. Call bell within reach. Instructed pt to call for assistance.

## 2023-03-20 NOTE — TRANSFER OF CARE
Anesthesia Transfer of Care Note    Patient: Mark Rich    Procedure(s) Performed: Procedure(s) (LRB):  HEMORRHOIDECTOMY x2 (N/A)  FISTULOTOMY (N/A)  SPHINCTEROTOMY, ANAL    Patient location: [facility]    Anesthesia Type: general    Transport from OR: Transported from OR on 6-10 L/min O2 by face mask with adequate spontaneous ventilation    Post pain: adequate analgesia    Post assessment: no apparent anesthetic complications    Post vital signs: stable    Level of consciousness: awake    Nausea/Vomiting: no nausea/vomiting    Complications: none    Transfer of care protocol was followed      Last vitals:   Visit Vitals  BP (!) 108/56 (BP Location: Right arm, Patient Position: Lying)   Pulse 75   Temp 36.7 °C (98 °F) (Oral)   Resp 18   Wt 70.3 kg (155 lb)   SpO2 100%   BMI 25.02 kg/m²

## 2023-03-22 ENCOUNTER — TELEPHONE (OUTPATIENT)
Dept: SURGERY | Facility: CLINIC | Age: 33
End: 2023-03-22
Payer: MEDICARE

## 2023-03-22 NOTE — TELEPHONE ENCOUNTER
Reached out to patient after noticing he did not complete his follow up survey for the Exparel study.  No Answer.  Left voice message.

## 2023-03-23 ENCOUNTER — TELEPHONE (OUTPATIENT)
Dept: SURGERY | Facility: CLINIC | Age: 33
End: 2023-03-23
Payer: MEDICARE

## 2023-03-23 NOTE — TELEPHONE ENCOUNTER
Again tried to reach out to the patient regarding the Exparel surveys for Day 1 and Day 2. No answer.  Left voice mail.

## 2023-03-24 ENCOUNTER — TELEPHONE (OUTPATIENT)
Dept: SURGERY | Facility: CLINIC | Age: 33
End: 2023-03-24
Payer: MEDICARE

## 2023-03-24 LAB
FINAL PATHOLOGIC DIAGNOSIS: NORMAL
Lab: NORMAL

## 2023-03-24 NOTE — TELEPHONE ENCOUNTER
Spoke with patient regarding pain from hemorrhoidectomy. Requesting something else to help with pain. Patient states that he is taking 2 pain pills at a time, tylenol, ibuprofen around the clock. Explained to patient that I will relay message to Dr. French to advise and let him know. Patient verbalizes understanding.

## 2023-03-24 NOTE — TELEPHONE ENCOUNTER
----- Message from Calixto LUISITO Route sent at 3/24/2023  4:49 PM CDT -----  Regarding: Pt. in Pain  Contact: Qamar [phone]  Pt. Mom Anu calls to say pt.  Had surgery on Mon and he is having some swelling and he is in pain.  Mom says pain seems like its more than in the beginning.Patient Requesting Call Back @ Anu

## 2023-04-03 RX ORDER — GABAPENTIN 100 MG/1
200 CAPSULE ORAL 3 TIMES DAILY
Qty: 84 CAPSULE | Refills: 0 | Status: SHIPPED | OUTPATIENT
Start: 2023-04-03 | End: 2023-04-17

## 2023-04-03 RX ORDER — OXYCODONE HYDROCHLORIDE 5 MG/1
5 TABLET ORAL EVERY 4 HOURS PRN
Qty: 30 TABLET | Refills: 0 | Status: SHIPPED | OUTPATIENT
Start: 2023-04-03

## 2023-04-03 RX ORDER — IBUPROFEN 800 MG/1
800 TABLET ORAL 3 TIMES DAILY PRN
Qty: 45 TABLET | Refills: 0 | Status: SHIPPED | OUTPATIENT
Start: 2023-04-03 | End: 2023-10-11 | Stop reason: SDUPTHER

## 2023-04-03 NOTE — TELEPHONE ENCOUNTER
----- Message from Pablo Lainez sent at 4/3/2023  4:38 PM CDT -----  Regarding: Refill  Contact: @[phone]  Patient is calling to get a refill on the following oxyCODONE (ROXICODONE) 5 MG immediate release tablet, gabapentin (NEURONTIN) 100 MG capsule & Ibuprofen 800 Mg  ... Please and advise @[phone]          [facility]/pharmacy #[id number] - [place] - [address]  [address] [address]  Phone: [phone] Fax: [phone]

## 2023-10-11 RX ORDER — IBUPROFEN 800 MG/1
800 TABLET ORAL
Qty: 45 TABLET | Refills: 0 | Status: SHIPPED | OUTPATIENT
Start: 2023-10-11

## 2024-10-11 ENCOUNTER — OFFICE VISIT (OUTPATIENT)
Dept: URGENT CARE | Facility: CLINIC | Age: 34
End: 2024-10-11
Payer: MEDICAID

## 2024-10-11 VITALS
DIASTOLIC BLOOD PRESSURE: 70 MMHG | OXYGEN SATURATION: 99 % | HEIGHT: 66 IN | WEIGHT: 155 LBS | BODY MASS INDEX: 24.91 KG/M2 | SYSTOLIC BLOOD PRESSURE: 102 MMHG | RESPIRATION RATE: 20 BRPM | TEMPERATURE: 98 F | HEART RATE: 66 BPM

## 2024-10-11 DIAGNOSIS — K04.7 DENTAL INFECTION: Primary | ICD-10-CM

## 2024-10-11 DIAGNOSIS — K08.89 PAIN, DENTAL: ICD-10-CM

## 2024-10-11 RX ORDER — KETOROLAC TROMETHAMINE 30 MG/ML
30 INJECTION, SOLUTION INTRAMUSCULAR; INTRAVENOUS
Status: COMPLETED | OUTPATIENT
Start: 2024-10-11 | End: 2024-10-11

## 2024-10-11 RX ORDER — IBUPROFEN 600 MG/1
600 TABLET ORAL EVERY 8 HOURS PRN
Qty: 15 TABLET | Refills: 0 | Status: SHIPPED | OUTPATIENT
Start: 2024-10-11

## 2024-10-11 RX ORDER — AMOXICILLIN AND CLAVULANATE POTASSIUM 875; 125 MG/1; MG/1
1 TABLET, FILM COATED ORAL EVERY 12 HOURS
Qty: 20 TABLET | Refills: 0 | Status: SHIPPED | OUTPATIENT
Start: 2024-10-11 | End: 2024-10-21

## 2024-10-11 RX ADMIN — KETOROLAC TROMETHAMINE 30 MG: 30 INJECTION, SOLUTION INTRAMUSCULAR; INTRAVENOUS at 12:10

## 2024-10-11 NOTE — PROGRESS NOTES
"Subjective:      Patient ID: Mark Rich is a 34 y.o. male.    Vitals:  height is 5' 6" (1.676 m) and weight is 70.3 kg (154 lb 15.7 oz). His oral temperature is 97.8 °F (36.6 °C). His blood pressure is 102/70 and his pulse is 66. His respiration is 20 and oxygen saturation is 99%.     Chief Complaint: Dental Pain    Pt states he has a possible dental abscess , states his tooth is possible loose in the back, states he is in and his right side of his face is swollen, denies fever, Home tx: oragel senosdyne.  Pain scale 8/10  Sx started 3 days ago and has gotten worse   Provider note below:  This is a 34 y.o. male who presents today with a chief complaint of right-sided lower tooth pain that started 3 days ago, patient reports he probably has infection and feel like his tooth mild be loose, denies any drainage or discharge, patient reports he tried over-the-counter medications with no relief denies fever, body aches or chills, denies cough, wheezing or shortness of breath, denies nausea, vomiting, diarrhea or abdominal pain, denies chest pain or dizziness positional lightheadedness, denies sore throat or trouble swallowing, denies loss of taste or smell, or any other symptoms       Dental Pain   The dental pain is located in He's tooth (teeth). The current episode started in the past 7 days. The pain is at a severity of 8/10. Associated symptoms include facial pain and thermal sensitivity. Pertinent negatives include no fever or sinus pressure.       Constitution: Negative for fever.   HENT:  Positive for dental problem. Negative for postnasal drip and sinus pressure.      Past Medical History:   Diagnosis Date    ADHD (attention deficit hyperactivity disorder)     Anxiety     Bipolar affective disorder     Bowel obstruction     Unspecified hemorrhoids         Objective:     Physical Exam   Constitutional: He is oriented to person, place, and time. He appears well-developed. He is cooperative.  Non-toxic " appearance. He does not appear ill. No distress.   HENT:   Head: Normocephalic and atraumatic.   Ears:   Right Ear: Hearing, tympanic membrane, external ear and ear canal normal.   Left Ear: Hearing, tympanic membrane, external ear and ear canal normal.   Nose: Nose normal. No mucosal edema, rhinorrhea or nasal deformity. No epistaxis. Right sinus exhibits no maxillary sinus tenderness and no frontal sinus tenderness. Left sinus exhibits no maxillary sinus tenderness and no frontal sinus tenderness.   Mouth/Throat: Uvula is midline, oropharynx is clear and moist and mucous membranes are normal. No trismus in the jaw. Abnormal dentition. Dental caries present. No dental abscesses or uvula swelling. No oropharyngeal exudate, posterior oropharyngeal edema, posterior oropharyngeal erythema, tonsillar abscesses or cobblestoning.       No Shashi's or trismus, uvula midline, able to tolerate secretions, no visible abscess noted, erythema, tenderness and swelling to gumline noted to molar tooth as in pic.  No facial swelling noted      Comments: No Shashi's or trismus, uvula midline, able to tolerate secretions, no visible abscess noted, erythema, tenderness and swelling to gumline noted to molar tooth as in pic.  No facial swelling noted  Eyes: Conjunctivae and lids are normal. No scleral icterus.   Neck: Trachea normal and phonation normal. Neck supple. No edema present. No erythema present. No neck rigidity present.   Cardiovascular: Normal rate, regular rhythm, normal heart sounds and normal pulses.   Pulmonary/Chest: Effort normal and breath sounds normal. No respiratory distress. He has no decreased breath sounds. He has no rhonchi.   Abdominal: Normal appearance.   Musculoskeletal: Normal range of motion.         General: No deformity. Normal range of motion.   Neurological: He is alert and oriented to person, place, and time. He exhibits normal muscle tone. Coordination normal.   Skin: Skin is warm, dry, intact, not  diaphoretic and not pale.   Psychiatric: His speech is normal and behavior is normal. Judgment and thought content normal.   Nursing note and vitals reviewed.        Patient in no acute distress.  Vitals reassuring.  Discussed results/diagnosis/plan in depth with patient in clinic. Strict precautions given to patient to monitor for worsening signs and symptoms. Advised to follow up with primary.All questions answered. Strict ER precautions given. If your symptoms worsens or fail to improve you should go to the Emergency Room. Discharge and follow-up instructions given verbally/printed. Discharge and follow-up instructions discussed with the patient who expressed understanding and willingness to comply with my recommendations.Patient voiced understanding and in agreement with current treatment plan.     Please be advised this text was dictated with Fusion-io software and may contain errors due to translation.   Assessment:     1. Dental infection    2. Pain, dental        Plan:       Dental infection  -     amoxicillin-clavulanate 875-125mg (AUGMENTIN) 875-125 mg per tablet; Take 1 tablet by mouth every 12 (twelve) hours. for 10 days  Dispense: 20 tablet; Refill: 0    Pain, dental  -     ibuprofen (ADVIL,MOTRIN) 600 MG tablet; Take 1 tablet (600 mg total) by mouth every 8 (eight) hours as needed for Pain.  Dispense: 15 tablet; Refill: 0  -     ketorolac injection 30 mg          Medical Decision Making:   History:   Old Medical Records: I decided to obtain old medical records.  Old Records Summarized: records from clinic visits and records from previous admission(s).  Urgent Care Management:  Patient in no acute distress.  Vitals reassuring.  On exam, patient is nontoxic appearing and afebrile.  Lungs CTA.  Physical examination as above.  No visible abscess noted.  Patient with swelling, tenderness and erythema to molar tooth as in pic.  No facial swelling noted.  Able to tolerate secretions, uvula midline.  Patient  requesting something for pain in clinic.  Toradol injection discussed with patient along with side effects and recommendations, patient agreed treatment with Toradol injection.  Advised patient not to take any NSAIDs for next 24 hours as he is given Toradol injection.  Chart reviewed, patient with history of bleeding hemorrhoid in the past, denies he had any rectal bleed. Will prescribe Augmentin for dental infection with detailed education provided to patient to follow-up with dentist ASAP with strict ER precautions  Medication prescribed and over-the-counter medication discussed with patient at length.  Proper hydration advised.  I reiterated the importance of further evaluation if no improvement symptoms and follow-up with primary. Patient voiced understanding and in agreement with current treatment plan.           Patient Instructions   PLEASE READ YOUR DISCHARGE INSTRUCTIONS ENTIRELY AS IT CONTAINS IMPORTANT INFORMATION.    DO NOT TAKE ANY MORE NAPROXEN OR IBUPROFEN FOR 24 HOURS AS YOU RECEIVED A LARGE DOSE OF IT VIA INJECTION    Use the mouthwash twice daily    Take the antibiotics to completion    Please see a dentist ASAP for further treatment as the infection can spread to your jaw bone. Ideally in 24-48 hours.    Please return or see your primary care doctor if you develop new or worsening symptoms.     You must understand that you have received an Urgent Care treatment only and that you may be released before all of your medical problems are known or treated.

## (undated) DEVICE — GOWN POLY REINF BRTH SLV XL

## (undated) DEVICE — LUBRICANT SURGILUBE 2 OZ

## (undated) DEVICE — TIP YANKAUERS BULB NO VENT

## (undated) DEVICE — DISSECTOR LIGASURE EXACT 21CM

## (undated) DEVICE — TAPE CURAD SILK ADH 3INX10YD

## (undated) DEVICE — DRAPE LAP T SHT W/ INSTR PAD

## (undated) DEVICE — BOVIE SUCTION

## (undated) DEVICE — PANTIES FEMININE NAPKIN LG/XLG

## (undated) DEVICE — SYR IRRIGATION BULB STER 60ML

## (undated) DEVICE — SUT 3-0 VICRYL / SH (J416)

## (undated) DEVICE — TRAY SKIN SCRUB WET PREMIUM

## (undated) DEVICE — ELECTRODE REM PLYHSV RETURN 9

## (undated) DEVICE — BRIEF STRTCH MESH XX-LG

## (undated) DEVICE — BOWL STERILE LARGE 32OZ

## (undated) DEVICE — TRAY MINOR GEN SURG OMC

## (undated) DEVICE — GAUZE SPONGE 4X4 12PLY